# Patient Record
Sex: MALE | Race: WHITE | NOT HISPANIC OR LATINO
[De-identification: names, ages, dates, MRNs, and addresses within clinical notes are randomized per-mention and may not be internally consistent; named-entity substitution may affect disease eponyms.]

---

## 2018-11-08 PROBLEM — Z87.891 FORMER SMOKER: Status: ACTIVE | Noted: 2018-11-08

## 2018-11-14 ENCOUNTER — APPOINTMENT (OUTPATIENT)
Dept: HEART AND VASCULAR | Facility: CLINIC | Age: 43
End: 2018-11-14
Payer: COMMERCIAL

## 2018-11-14 VITALS
DIASTOLIC BLOOD PRESSURE: 74 MMHG | HEIGHT: 71 IN | OXYGEN SATURATION: 97 % | SYSTOLIC BLOOD PRESSURE: 124 MMHG | BODY MASS INDEX: 31.92 KG/M2 | WEIGHT: 228 LBS | HEART RATE: 73 BPM

## 2018-11-14 DIAGNOSIS — Z87.891 PERSONAL HISTORY OF NICOTINE DEPENDENCE: ICD-10-CM

## 2018-11-14 PROCEDURE — 99214 OFFICE O/P EST MOD 30 MIN: CPT | Mod: 25

## 2018-11-14 PROCEDURE — 93000 ELECTROCARDIOGRAM COMPLETE: CPT

## 2018-11-14 NOTE — REVIEW OF SYSTEMS
[Shortness Of Breath] : shortness of breath [Chest Pain] : chest pain [Negative] : Heme/Lymph [see HPI] : see HPI [Cough] : cough [Skin Lesions] : skin lesion(s): [Dyspnea on exertion] : not dyspnea during exertion [Lower Ext Edema] : no extremity edema [Leg Claudication] : no intermittent leg claudication [Palpitations] : no palpitations

## 2018-11-14 NOTE — ASSESSMENT
[FreeTextEntry1] : \par 1. CAD: diagnosed via EBT, Ca+ score 29.4, 81st percentile (08/26/15), s/p mid LAD MI with PCI x 2 (05/25/18)\par             - will pursue aggressive medical management and lifestyle intervention\par             - continue atorvastatin 80mg po daily for maximal CV risk reduction\par             - continue DAPT with ASA and Brilinta x 12 months (possible ROWE on Brillinta)\par \par 2. Tobacco abuse counseling: not tolerant of Chantix (bad dreams),now back on the patch \par             - has not smoked since 05/25/18\par             - d/w importance of strict abstinence from tobacco\par  \par 3. Systolic heart failure: EF 35% s/p LAD MI 05/25/18, NYHA II-III, + cough on lisinopril \par             - continue Toprol XL 50mg po daily\par             - continue losartan 50mg po daily (will check with pharmacists about recall, will switch to HF approved ARB if EF remains reduced)\par             - will send for an echocardiogram\par             - given relatively low SBP may not be able to up titrate dose of HF regimen\par

## 2018-11-14 NOTE — PHYSICAL EXAM
[General Appearance - Well Developed] : well developed [Normal Appearance] : normal appearance [Well Groomed] : well groomed [General Appearance - Well Nourished] : well nourished [No Deformities] : no deformities [General Appearance - In No Acute Distress] : no acute distress [Normal Conjunctiva] : the conjunctiva exhibited no abnormalities [Eyelids - No Xanthelasma] : the eyelids demonstrated no xanthelasmas [Normal Oral Mucosa] : normal oral mucosa [No Oral Pallor] : no oral pallor [No Oral Cyanosis] : no oral cyanosis [Normal Jugular Venous A Waves Present] : normal jugular venous A waves present [Normal Jugular Venous V Waves Present] : normal jugular venous V waves present [No Jugular Venous Miller A Waves] : no jugular venous miller A waves [Respiration, Rhythm And Depth] : normal respiratory rhythm and effort [Exaggerated Use Of Accessory Muscles For Inspiration] : no accessory muscle use [Auscultation Breath Sounds / Voice Sounds] : lungs were clear to auscultation bilaterally [Heart Rate And Rhythm] : heart rate and rhythm were normal [Heart Sounds] : normal S1 and S2 [Murmurs] : no murmurs present [Abdomen Soft] : soft [Abdomen Tenderness] : non-tender [Abdomen Mass (___ Cm)] : no abdominal mass palpated [Abnormal Walk] : normal gait [Gait - Sufficient For Exercise Testing] : the gait was sufficient for exercise testing [Nail Clubbing] : no clubbing of the fingernails [Cyanosis, Localized] : no localized cyanosis [Petechial Hemorrhages (___cm)] : no petechial hemorrhages [Skin Color & Pigmentation] : normal skin color and pigmentation [] : no rash [No Venous Stasis] : no venous stasis [Skin Lesions] : no skin lesions [No Skin Ulcers] : no skin ulcer [No Xanthoma] : no  xanthoma was observed [Oriented To Time, Place, And Person] : oriented to person, place, and time [Affect] : the affect was normal [Mood] : the mood was normal [No Anxiety] : not feeling anxious [FreeTextEntry1] : + ecchymosis

## 2018-11-14 NOTE — REASON FOR VISIT
[Follow-Up - Clinic] : a clinic follow-up of [FreeTextEntry1] : \par Diagnostic Tests:\par --------------------------------\par ECG:\par 11/14/18: NSR, anteroseptal MI. \par 05/29/18: NSR, anteroseptal MI. \par 02/26/18: NSR, normal ECG.\par 10/14/15: NSR, normal ECG.\par ---------------------------------\par CT:\par 08/26/15: EBT Ca+ score: 29, 81st percentile\par ---------------------------------\par Stress:\par 11/05/15: exercise stress echo: EF 64%, 10 METS, normal wall motion and PA pressures.\par ---------------------------------\par Cath:\par 05/225/18: at [facility]: acute NSTEMI LAD: mid % (PCI x 2), otherwise mild disease\par ---------------------------------\par Echo:\par 06/28/18: EF 48%, grade I diastolic dysfunction, apical septal and apical akinesis, trace MR/TR. \par 05/29/18: EF 35%, large apical MI, mild LVH, trace MR/TR.\par

## 2018-11-14 NOTE — HISTORY OF PRESENT ILLNESS
[FreeTextEntry1] : \par Mr. Mott presents for follow up and management of CAD  s/p apical MI with PCI LAD 05/25/18, dyslipidemia, overweight, and tobacco use. As part of a union contract, he had a "heart scan" via EBT at [facility] on 08/26/15 which revealed a CA+ score of 29 (81st percentile).  He had an exercise stress echocardiogram on 11/05/15 which revealed an EF of 64%, 10 METS, normal wall motion, and normal PA pressures.  He has an extensive tobacco use history (1 PPD since his teenage years) and has tried several times.  We had an extensive discussion about the nature of the EBT findings as well as the need for aggressive lifestyle modifications to prevent CV events in the future.  On 5/25/18 he began to experience substernal chest pain which he describes as " an elephant sitting on his chest" associated with SOB.  He called 911 and was taken to [facility], where he was diagnosed with an acute non-ST elevation myocardial infarction and underwent invasive coronary angiography revealing a 100% thrombotic occlusion of the mid LAD with otherwise mild disease.  He went on to have PCI x 2 to his mid LAD with a good result.  (Joshua Schmitt -[phone], fax [phone]).  He was discharged on Sunday.  Since being discharged, he feels fatigued with minor chest discomfort.  He is currently wearing a nicotine patch and has not smoked in 3 days.  He had an echocardiogram today (05/29/18) in the office which revealed an EF of 35%, a large apical MI, and no significant valvular disease.  He recently started cardiac rehab at [facility]. He denies chest pain but has occasional ROWE.  He had to interupt cardiac rehab after sustaining an ankle sprain.  He had a cough on lisinopril and was switched to losartan 50mg po daily.  We discussed the recent losartan recall and he will check with his pharmacist to see if his lot was affected.  \par

## 2018-11-30 ENCOUNTER — APPOINTMENT (OUTPATIENT)
Dept: HEART AND VASCULAR | Facility: CLINIC | Age: 43
End: 2018-11-30
Payer: COMMERCIAL

## 2018-11-30 VITALS
DIASTOLIC BLOOD PRESSURE: 76 MMHG | SYSTOLIC BLOOD PRESSURE: 116 MMHG | HEART RATE: 81 BPM | TEMPERATURE: 98.5 F | OXYGEN SATURATION: 95 %

## 2018-11-30 PROCEDURE — 93306 TTE W/DOPPLER COMPLETE: CPT

## 2019-02-25 ENCOUNTER — NON-APPOINTMENT (OUTPATIENT)
Age: 44
End: 2019-02-25

## 2019-02-25 ENCOUNTER — APPOINTMENT (OUTPATIENT)
Dept: HEART AND VASCULAR | Facility: CLINIC | Age: 44
End: 2019-02-25
Payer: COMMERCIAL

## 2019-02-25 VITALS
HEIGHT: 71 IN | OXYGEN SATURATION: 95 % | TEMPERATURE: 98.2 F | BODY MASS INDEX: 34.02 KG/M2 | WEIGHT: 243 LBS | HEART RATE: 76 BPM | DIASTOLIC BLOOD PRESSURE: 70 MMHG | SYSTOLIC BLOOD PRESSURE: 104 MMHG

## 2019-02-25 PROCEDURE — 99214 OFFICE O/P EST MOD 30 MIN: CPT

## 2019-03-04 ENCOUNTER — APPOINTMENT (OUTPATIENT)
Dept: HEART AND VASCULAR | Facility: CLINIC | Age: 44
End: 2019-03-04
Payer: COMMERCIAL

## 2019-03-04 PROCEDURE — 93351 STRESS TTE COMPLETE: CPT

## 2019-03-18 NOTE — ASSESSMENT
[FreeTextEntry1] : 1. CAD: diagnosed via EBT, Ca+ score 29.4, 81st percentile (08/26/15), s/p mid LAD MI with PCI x 2 (05/25/18)\par             - will pursue aggressive medical management and lifestyle intervention\par             - continue atorvastatin 80mg po daily for maximal CV risk reduction\par             - continue DAPT with ASA and Brilinta x 12 months (possible ROWE on Brillinta)\par             - will send for an exercise stress echocardiogram \par \par 2. Tobacco abuse counseling: not tolerant of Chantix (bad dreams),now back on the patch \par             - has not smoked since 05/25/18\par             - d/w importance of strict abstinence from tobacco\par  \par 3. Systolic heart failure: EF normalized: s/p echo: 11/30/18: EF 58%, akinesis apical septum and apex, trace MR/TR.\par             - continue Toprol XL 50mg po daily\par             - continue losartan 50mg po daily\par \par

## 2019-03-18 NOTE — REASON FOR VISIT
[Follow-Up - Clinic] : a clinic follow-up of [FreeTextEntry1] : Diagnostic Tests:\par --------------------------------\par ECG:\par 11/14/18: NSR, anteroseptal MI. \par 05/29/18: NSR, anteroseptal MI. \par 02/26/18: NSR, normal ECG.\par 10/14/15: NSR, normal ECG.\par ---------------------------------\par CT:\par 08/26/15: EBT Ca+ score: 29, 81st percentile\par ---------------------------------\par Stress:\par 02/19/19: ETT only cardiac rehab: 7.5 METS, normal ECG. \par 11/05/15: exercise stress echo: EF 64%, 10 METS, normal wall motion and PA pressures.\par ---------------------------------\par Cath:\par 05/225/18: at [facility]: acute NSTEMI LAD: mid % (PCI x 2), otherwise mild disease\par ---------------------------------\par Echo:\par 11/30/18: EF 58%, akinesis apical septum and apex, trace MR/TR.\par 06/28/18: EF 48%, grade I diastolic dysfunction, apical septal and apical akinesis, trace MR/TR. \par 05/29/18: EF 35%, large apical MI, mild LVH, trace MR/TR.\par

## 2019-03-18 NOTE — REVIEW OF SYSTEMS
[Shortness Of Breath] : shortness of breath [Chest Pain] : chest pain [see HPI] : see HPI [Cough] : cough [Skin Lesions] : skin lesion(s): [Negative] : Heme/Lymph [Dyspnea on exertion] : not dyspnea during exertion [Lower Ext Edema] : no extremity edema [Leg Claudication] : no intermittent leg claudication [Palpitations] : no palpitations

## 2019-03-18 NOTE — HISTORY OF PRESENT ILLNESS
[FreeTextEntry1] : \par Mr. Mott presents for follow up and management of CAD  s/p apical MI with PCI LAD 05/25/18, dyslipidemia, overweight, and tobacco use. As part of a union contract, he had a "heart scan" via EBT at [facility] on 08/26/15 which revealed a CA+ score of 29 (81st percentile).  He had an exercise stress echocardiogram on 11/05/15 which revealed an EF of 64%, 10 METS, normal wall motion, and normal PA pressures.  He has an extensive tobacco use history (1 PPD since his teenage years) and has tried several times.  We had an extensive discussion about the nature of the EBT findings as well as the need for aggressive lifestyle modifications to prevent CV events in the future.  On 5/25/18 he began to experience substernal chest pain which he describes as " an elephant sitting on his chest" associated with SOB.  He called 911 and was taken to [facility], where he was diagnosed with an acute non-ST elevation myocardial infarction and underwent invasive coronary angiography revealing a 100% thrombotic occlusion of the mid LAD with otherwise mild disease.  He went on to have PCI x 2 to his mid LAD with a good result.  (Joshua Schmitt -[phone], fax [phone]).  He was discharged on Sunday.  Since being discharged, he feels fatigued with minor chest discomfort.  He is currently wearing a nicotine patch and has not smoked in 3 days.  He had an echocardiogram today (05/29/18) in the office which revealed an EF of 35%, a large apical MI, and no significant valvular disease.  He completed cardiac rehab at [facility]. He denies chest pain but has occasional ROWE.  He had to interupt cardiac rehab after sustaining an ankle sprain.  He had a cough on lisinopril and was switched to losartan 50mg po daily.   He had a repeat echocardiogram on 11/30/18 which revealed an EF of 58%, akinesis apical septum and apex, and trace MR/TR.  He does admit to occasional chest pain and ROWE.  We had a discussion about the duration of DAPT therapy being long-term unless bleeding occurs.  In addition, we discussed the results of the TRED- HF trial and agreed to remain on Toprol and losartan. \par

## 2019-05-16 ENCOUNTER — APPOINTMENT (OUTPATIENT)
Dept: HEART AND VASCULAR | Facility: CLINIC | Age: 44
End: 2019-05-16
Payer: COMMERCIAL

## 2019-05-16 VITALS
HEIGHT: 71 IN | BODY MASS INDEX: 34.52 KG/M2 | DIASTOLIC BLOOD PRESSURE: 68 MMHG | TEMPERATURE: 98.2 F | WEIGHT: 246.6 LBS | HEART RATE: 84 BPM | OXYGEN SATURATION: 96 % | SYSTOLIC BLOOD PRESSURE: 105 MMHG

## 2019-05-16 PROCEDURE — 99214 OFFICE O/P EST MOD 30 MIN: CPT

## 2019-05-16 NOTE — HISTORY OF PRESENT ILLNESS
[FreeTextEntry1] : Mr. Mott presents for follow up and management of CAD  s/p apical MI with PCI LAD 05/25/18, dyslipidemia, overweight, and tobacco use. As part of a union contract, he had a "heart scan" via EBT at [facility] on 08/26/15 which revealed a CA+ score of 29 (81st percentile).  He had an exercise stress echocardiogram on 11/05/15 which revealed an EF of 64%, 10 METS, normal wall motion, and normal PA pressures.  He has an extensive tobacco use history (1 PPD since his teenage years) and has tried several times.  We had an extensive discussion about the nature of the EBT findings as well as the need for aggressive lifestyle modifications to prevent CV events in the future.  On 5/25/18 he began to experience substernal chest pain which he describes as " an elephant sitting on his chest" associated with SOB.  He called 911 and was taken to [facility], where he was diagnosed with an acute non-ST elevation myocardial infarction and underwent invasive coronary angiography revealing a 100% thrombotic occlusion of the mid LAD with otherwise mild disease.  He went on to have PCI x 2 to his mid LAD with a good result.  (Joshua Schmitt [phone], fax [phone]).  He was discharged on Sunday.  Since being discharged, he feels fatigued with minor chest discomfort.  He is currently wearing a nicotine patch and has not smoked in 3 days.  He had an echocardiogram on 05/29/18 which revealed an EF of 35%, a large apical MI, and no significant valvular disease.  He completed cardiac rehab at [facility]. He denies chest pain but has occasional ROWE.  He had to interupt cardiac rehab after sustaining an ankle sprain.  He had a cough on lisinopril and was switched to losartan 50mg po daily.   He had a repeat echocardiogram on 11/30/18 which revealed an EF of 58%, akinesis apical septum and apex, and trace MR/TR.  He does admit to occasional chest pain and ROWE.  He had an exercise stress echocardiogram on 03/04/19 which reveled an EF of 60%, akinesis apical anterior, apical septum, and apex at rest, no inducible ischemia and normal PA pressures post 10 METS.   We had a discussion about the duration of DAPT therapy being long-term unless bleeding occurs.  In addition, we discussed the results of the TRED-HF trial and agreed to remain on Toprol and losartan.  He was evaluated by a neurologist and was given a prescription for Neurontin.  His ROWE is stable at 1-2 blocks.  He continues to have anxiety surrounding his cardiac event. \par

## 2019-05-16 NOTE — REASON FOR VISIT
[FreeTextEntry1] : Diagnostic Tests:\par --------------------------------\par ECG:\par 11/14/18: NSR, anteroseptal MI. \par 05/29/18: NSR, anteroseptal MI. \par 02/26/18: NSR, normal ECG.\par 10/14/15: NSR, normal ECG.\par ---------------------------------\par CT:\par 08/26/15: EBT Ca+ score: 29, 81st percentile\par ---------------------------------\par Stress:\par 03/04/19: exercise stress echo: EF 60%, akinesis apical anterior, apical septum, and apex at rest, no inducible ischemia and normal PA pressures post 10 METS. \par 02/19/19: ETT only cardiac rehab: 7.5 METS, normal ECG. \par 11/05/15: exercise stress echo: EF 64%, 10 METS, normal wall motion and PA pressures.\par ---------------------------------\par Cath:\par 05/225/18: at [facility]: acute NSTEMI LAD: mid % (PCI x 2), otherwise mild disease\par ---------------------------------\par Echo:\par 11/30/18: EF 58%, akinesis apical septum and apex, trace MR/TR.\par 06/28/18: EF 48%, grade I diastolic dysfunction, apical septal and apical akinesis, trace MR/TR. \par 05/29/18: EF 35%, large apical MI, mild LVH, trace MR/TR.\par

## 2019-05-16 NOTE — ASSESSMENT
[FreeTextEntry1] : 1. CAD: diagnosed via EBT, Ca+ score 29.4, 81st percentile (08/26/15), s/p mid LAD MI with PCI x 2 (05/25/18), s/p 03/04/19: exercise stress echo: EF 60%, akinesis apical anterior, apical septum, and apex at rest, no inducible ischemia and normal PA pressures post 10 METS. \par             - will pursue aggressive medical management and lifestyle intervention\par             - continue atorvastatin 80mg po daily for maximal CV risk reduction\par             - continue DAPT with ASA and clopidogrel lifelong as tolerated\par             - check lab work today\par \par 2. Tobacco abuse counseling: not tolerant of Chantix (bad dreams)\par             - has not smoked since 05/25/18\par             - d/w importance of strict abstinence from tobacco\par  \par 3. Systolic heart failure: EF normalized: s/p echo: 11/30/18: EF 58%, akinesis apical septum and apex, trace MR/TR.\par             - continue Toprol XL 50mg po daily\par             - continue losartan 50mg po daily\par \par

## 2019-05-16 NOTE — REVIEW OF SYSTEMS
[Dyspnea on exertion] : not dyspnea during exertion [Leg Claudication] : no intermittent leg claudication [Lower Ext Edema] : no extremity edema [Palpitations] : no palpitations [Skin Lesions] : no skin lesions [Cough] : no cough

## 2019-05-17 LAB
ALBUMIN SERPL ELPH-MCNC: 4.4 G/DL
ALP BLD-CCNC: 131 U/L
ALT SERPL-CCNC: 53 U/L
ANION GAP SERPL CALC-SCNC: 14 MMOL/L
AST SERPL-CCNC: 30 U/L
BASOPHILS # BLD AUTO: 0.06 K/UL
BASOPHILS NFR BLD AUTO: 0.8 %
BILIRUB SERPL-MCNC: 0.5 MG/DL
BUN SERPL-MCNC: 15 MG/DL
CALCIUM SERPL-MCNC: 9 MG/DL
CHLORIDE SERPL-SCNC: 104 MMOL/L
CHOLEST SERPL-MCNC: 135 MG/DL
CHOLEST/HDLC SERPL: 5.9 RATIO
CO2 SERPL-SCNC: 23 MMOL/L
CREAT SERPL-MCNC: 1.02 MG/DL
EOSINOPHIL # BLD AUTO: 0.16 K/UL
EOSINOPHIL NFR BLD AUTO: 2.1 %
ESTIMATED AVERAGE GLUCOSE: 111 MG/DL
GLUCOSE SERPL-MCNC: 116 MG/DL
HBA1C MFR BLD HPLC: 5.5 %
HCT VFR BLD CALC: 50.3 %
HDLC SERPL-MCNC: 23 MG/DL
HGB BLD-MCNC: 16.1 G/DL
IMM GRANULOCYTES NFR BLD AUTO: 1.1 %
LDLC SERPL CALC-MCNC: 43 MG/DL
LDLC SERPL DIRECT ASSAY-MCNC: 75 MG/DL
LYMPHOCYTES # BLD AUTO: 1.92 K/UL
LYMPHOCYTES NFR BLD AUTO: 25.6 %
MAN DIFF?: NORMAL
MCHC RBC-ENTMCNC: 30.7 PG
MCHC RBC-ENTMCNC: 32 GM/DL
MCV RBC AUTO: 95.8 FL
MONOCYTES # BLD AUTO: 0.59 K/UL
MONOCYTES NFR BLD AUTO: 7.9 %
NEUTROPHILS # BLD AUTO: 4.7 K/UL
NEUTROPHILS NFR BLD AUTO: 62.5 %
PLATELET # BLD AUTO: 319 K/UL
POTASSIUM SERPL-SCNC: 5 MMOL/L
PROT SERPL-MCNC: 7.5 G/DL
RBC # BLD: 5.25 M/UL
RBC # FLD: 13 %
SODIUM SERPL-SCNC: 141 MMOL/L
TRIGL SERPL-MCNC: 343 MG/DL
TSH SERPL-ACNC: 0.95 UIU/ML
WBC # FLD AUTO: 7.51 K/UL

## 2019-06-25 ENCOUNTER — MEDICATION RENEWAL (OUTPATIENT)
Age: 44
End: 2019-06-25

## 2019-07-08 ENCOUNTER — APPOINTMENT (OUTPATIENT)
Dept: HEART AND VASCULAR | Facility: CLINIC | Age: 44
End: 2019-07-08
Payer: COMMERCIAL

## 2019-07-08 VITALS
HEIGHT: 71 IN | HEART RATE: 82 BPM | TEMPERATURE: 98.6 F | OXYGEN SATURATION: 96 % | DIASTOLIC BLOOD PRESSURE: 75 MMHG | SYSTOLIC BLOOD PRESSURE: 120 MMHG | WEIGHT: 248 LBS | BODY MASS INDEX: 34.72 KG/M2

## 2019-07-08 PROCEDURE — 99214 OFFICE O/P EST MOD 30 MIN: CPT

## 2019-07-08 NOTE — REASON FOR VISIT
[Follow-Up - Clinic] : a clinic follow-up of [FreeTextEntry1] : CAD s/p apical MI with PCI LAD 05/25/18, dyslipidemia, overweight, and tobacco use

## 2019-07-08 NOTE — PHYSICAL EXAM
[General Appearance - Well Developed] : well developed [Normal Appearance] : normal appearance [General Appearance - Well Nourished] : well nourished [Well Groomed] : well groomed [No Deformities] : no deformities [Normal Conjunctiva] : the conjunctiva exhibited no abnormalities [General Appearance - In No Acute Distress] : no acute distress [No Oral Pallor] : no oral pallor [Normal Oral Mucosa] : normal oral mucosa [Eyelids - No Xanthelasma] : the eyelids demonstrated no xanthelasmas [No Oral Cyanosis] : no oral cyanosis [Normal Jugular Venous A Waves Present] : normal jugular venous A waves present [No Jugular Venous Miller A Waves] : no jugular venous miller A waves [Normal Jugular Venous V Waves Present] : normal jugular venous V waves present [Heart Rate And Rhythm] : heart rate and rhythm were normal [Respiration, Rhythm And Depth] : normal respiratory rhythm and effort [Exaggerated Use Of Accessory Muscles For Inspiration] : no accessory muscle use [Auscultation Breath Sounds / Voice Sounds] : lungs were clear to auscultation bilaterally [Heart Sounds] : normal S1 and S2 [Murmurs] : no murmurs present [Abdomen Soft] : soft [Abdomen Tenderness] : non-tender [Abdomen Mass (___ Cm)] : no abdominal mass palpated [Abnormal Walk] : normal gait [Gait - Sufficient For Exercise Testing] : the gait was sufficient for exercise testing [Nail Clubbing] : no clubbing of the fingernails [Cyanosis, Localized] : no localized cyanosis [Petechial Hemorrhages (___cm)] : no petechial hemorrhages [] : no rash [Skin Color & Pigmentation] : normal skin color and pigmentation [No Skin Ulcers] : no skin ulcer [Skin Lesions] : no skin lesions [No Venous Stasis] : no venous stasis [Oriented To Time, Place, And Person] : oriented to person, place, and time [FreeTextEntry1] : + ecchymosis [No Xanthoma] : no  xanthoma was observed [Mood] : the mood was normal [No Anxiety] : not feeling anxious [Affect] : the affect was normal

## 2019-07-08 NOTE — REVIEW OF SYSTEMS
[Shortness Of Breath] : shortness of breath [Chest Pain] : chest pain [Dyspnea on exertion] : not dyspnea during exertion [Leg Claudication] : no intermittent leg claudication [Lower Ext Edema] : no extremity edema [Palpitations] : no palpitations [see HPI] : see HPI [Cough] : no cough [Skin Lesions] : no skin lesions [Negative] : Psychiatric

## 2019-07-08 NOTE — HISTORY OF PRESENT ILLNESS
[FreeTextEntry1] : Mr. Mott presents for follow up and management of CAD  s/p apical MI with PCI LAD 05/25/18, dyslipidemia, overweight, and tobacco use. As part of a union contract, he had a "heart scan" via EBT at [facility] on 08/26/15 which revealed a CA+ score of 29 (81st percentile).  He had an exercise stress echocardiogram on 11/05/15 which revealed an EF of 64%, 10 METS, normal wall motion, and normal PA pressures.  He has an extensive tobacco use history (1 PPD since his teenage years) and has tried several times.  We had an extensive discussion about the nature of the EBT findings as well as the need for aggressive lifestyle modifications to prevent CV events in the future.  On 5/25/18 he began to experience substernal chest pain which he describes as " an elephant sitting on his chest" associated with SOB.  He called 911 and was taken to [facility], where he was diagnosed with an acute non-ST elevation myocardial infarction and underwent invasive coronary angiography revealing a 100% thrombotic occlusion of the mid LAD with otherwise mild disease.  He went on to have PCI x 2 to his mid LAD with a good result.  (Joshua Schmitt -[phone], fax [phone]).  He was discharged on Sunday.  Since being discharged, he feels fatigued with minor chest discomfort.  He is currently wearing a nicotine patch and has not smoked in 3 days.  He had an echocardiogram on 05/29/18 which revealed an EF of 35%, a large apical MI, and no significant valvular disease.  He completed cardiac rehab at [facility]. He denies chest pain but has occasional ROWE.  He had to interrupt cardiac rehab after sustaining an ankle sprain.  He had a cough on lisinopril and was switched to losartan 50mg po daily.   He had a repeat echocardiogram on 11/30/18 which revealed an EF of 58%, akinesis apical septum and apex, and trace MR/TR.  He does admit to occasional chest pain and ROWE.  He had an exercise stress echocardiogram on 03/04/19 which reveled an EF of 60%, akinesis apical anterior, apical septum, and apex at rest, no inducible ischemia and normal PA pressures post 10 METS.   We had a discussion about the duration of DAPT therapy being long-term unless bleeding occurs.  In addition, we discussed the results of the TRED-HF trial and agreed to remain on Toprol and losartan.  He was evaluated by a neurologist for cervical and lumbar spinal neuropathy and was given a prescription for Neurontin but continues to have neuropathic pain.  His ROWE is stable at 1-2 blocks.  He continues to have anxiety surrounding his cardiac event. \par

## 2019-07-08 NOTE — ASSESSMENT
[FreeTextEntry1] : 1. CAD: diagnosed via EBT, Ca+ score 29.4, 81st percentile (08/26/15), s/p mid LAD MI with PCI x 2 (05/25/18), s/p 03/04/19: exercise stress echo: EF 60%, akinesis apical anterior, apical septum, and apex at rest, no inducible ischemia and normal PA pressures post 10 METS. \par             - will pursue aggressive medical management and lifestyle intervention\par             - continue atorvastatin 80mg po daily for maximal CV risk reduction\par             - continue DAPT with ASA and clopidogrel lifelong as tolerated\par \par 2. Tobacco abuse counseling: not tolerant of Chantix (bad dreams)\par             - has not smoked since 05/25/18\par             - d/w importance of strict abstinence from tobacco\par  \par 3. Systolic heart failure: EF normalized: s/p echo: 11/30/18: EF 58%, akinesis apical septum and apex, trace MR/TR.\par             - continue Toprol XL 50mg po daily\par             - continue losartan 50mg po daily\par \par 4. Cervical and lumbar disc disease: + neuropathy\par             - continue follow up with neurologist\par

## 2019-08-26 ENCOUNTER — NON-APPOINTMENT (OUTPATIENT)
Age: 44
End: 2019-08-26

## 2019-08-26 ENCOUNTER — APPOINTMENT (OUTPATIENT)
Dept: HEART AND VASCULAR | Facility: CLINIC | Age: 44
End: 2019-08-26
Payer: COMMERCIAL

## 2019-08-26 VITALS
SYSTOLIC BLOOD PRESSURE: 112 MMHG | HEART RATE: 80 BPM | DIASTOLIC BLOOD PRESSURE: 73 MMHG | WEIGHT: 249 LBS | TEMPERATURE: 98.1 F | OXYGEN SATURATION: 97 % | BODY MASS INDEX: 34.86 KG/M2 | HEIGHT: 71 IN

## 2019-08-26 PROCEDURE — 99214 OFFICE O/P EST MOD 30 MIN: CPT | Mod: 25

## 2019-08-26 PROCEDURE — 93000 ELECTROCARDIOGRAM COMPLETE: CPT

## 2019-08-26 NOTE — REASON FOR VISIT
[Follow-Up - Clinic] : a clinic follow-up of [FreeTextEntry1] : Diagnostic Tests:\par --------------------------------\par ECG:\par 08/26/19: NSR, anteroseptal MI. \par 11/14/18: NSR, anteroseptal MI. \par 05/29/18: NSR, anteroseptal MI. \par 02/26/18: NSR, normal ECG.\par 10/14/15: NSR, normal ECG.\par ---------------------------------\par CT:\par 08/26/15: EBT Ca+ score: 29, 81st percentile\par ---------------------------------\par Stress:\par 03/04/19: exercise stress echo: EF 60%, akinesis apical anterior, apical septum, and apex at rest, no inducible ischemia and normal PA pressures post 10 METS. \par 02/19/19: ETT only cardiac rehab: 7.5 METS, normal ECG. \par 11/05/15: exercise stress echo: EF 64%, 10 METS, normal wall motion and PA pressures.\par ---------------------------------\par Cath:\par 05/25/18: at [facility]: acute NSTEMI LAD: mid % (PCI x 2), otherwise mild disease\par ---------------------------------\par Echo:\par 11/30/18: EF 58%, akinesis apical septum and apex, trace MR/TR.\par 06/28/18: EF 48%, grade I diastolic dysfunction, apical septal and apical akinesis, trace MR/TR. \par 05/29/18: EF 35%, large apical MI, mild LVH, trace MR/TR.\par

## 2019-08-26 NOTE — REVIEW OF SYSTEMS
[Shortness Of Breath] : shortness of breath [Chest Pain] : chest pain [see HPI] : see HPI [Negative] : Heme/Lymph [Dyspnea on exertion] : not dyspnea during exertion [Leg Claudication] : no intermittent leg claudication [Lower Ext Edema] : no extremity edema [Cough] : no cough [Palpitations] : no palpitations [Skin Lesions] : no skin lesions

## 2019-08-26 NOTE — HISTORY OF PRESENT ILLNESS
[FreeTextEntry1] : Mr. Mott presents for follow up and management of CAD  s/p apical MI with PCI LAD 05/25/18, dyslipidemia, overweight, and tobacco use. As part of a union contract, he had a "heart scan" via EBT at [facility] on 08/26/15 which revealed a CA+ score of 29 (81st percentile).  He had an exercise stress echocardiogram on 11/05/15 which revealed an EF of 64%, 10 METS, normal wall motion, and normal PA pressures.  He has an extensive tobacco use history (1 PPD since his teenage years) and has tried several times.  We had an extensive discussion about the nature of the EBT findings as well as the need for aggressive lifestyle modifications to prevent CV events in the future.  On 5/25/18 he began to experience substernal chest pain which he describes as " an elephant sitting on his chest" associated with SOB.  He called 911 and was taken to [facility], where he was diagnosed with an acute non-ST elevation myocardial infarction and underwent invasive coronary angiography revealing a 100% thrombotic occlusion of the mid LAD with otherwise mild disease.  He went on to have PCI x 2 to his mid LAD with a good result.  (Joshua Schmitt -[phone], fax [phone]).  He was discharged on Sunday.  Since being discharged, he feels fatigued with minor chest discomfort.  He is currently wearing a nicotine patch and has not smoked in 3 days.  He had an echocardiogram on 05/29/18 which revealed an EF of 35%, a large apical MI, and no significant valvular disease.  He completed cardiac rehab at [facility]. He denies chest pain but has occasional ROWE.  He had to interrupt cardiac rehab after sustaining an ankle sprain.  He had a cough on lisinopril and was switched to losartan 50mg po daily.   He had a repeat echocardiogram on 11/30/18 which revealed an EF of 58%, akinesis apical septum and apex, and trace MR/TR.  He does admit to occasional chest pain and ROWE.  He had an exercise stress echocardiogram on 03/04/19 which reveled an EF of 60%, akinesis apical anterior, apical septum, and apex at rest, no inducible ischemia and normal PA pressures post 10 METS.   We had a discussion about the duration of DAPT therapy being long-term unless bleeding occurs.  In addition, we discussed the results of the TRED-HF trial and agreed to remain on Toprol and losartan.  He was evaluated by a neurologist for cervical and lumbar spinal neuropathy and was given a prescription for Neurontin but continues to have neuropathic pain.  His ROWE is stable at 1-2 blocks. He continues to have complaints of intermittent chest discomfort.  he is complaint with all medications. \par

## 2019-08-26 NOTE — PHYSICAL EXAM
[General Appearance - Well Developed] : well developed [Normal Appearance] : normal appearance [Well Groomed] : well groomed [General Appearance - Well Nourished] : well nourished [No Deformities] : no deformities [General Appearance - In No Acute Distress] : no acute distress [Normal Conjunctiva] : the conjunctiva exhibited no abnormalities [Eyelids - No Xanthelasma] : the eyelids demonstrated no xanthelasmas [Normal Oral Mucosa] : normal oral mucosa [No Oral Pallor] : no oral pallor [No Oral Cyanosis] : no oral cyanosis [Normal Jugular Venous A Waves Present] : normal jugular venous A waves present [Normal Jugular Venous V Waves Present] : normal jugular venous V waves present [No Jugular Venous Miller A Waves] : no jugular venous miller A waves [Respiration, Rhythm And Depth] : normal respiratory rhythm and effort [Exaggerated Use Of Accessory Muscles For Inspiration] : no accessory muscle use [Auscultation Breath Sounds / Voice Sounds] : lungs were clear to auscultation bilaterally [Heart Rate And Rhythm] : heart rate and rhythm were normal [Heart Sounds] : normal S1 and S2 [Murmurs] : no murmurs present [Abdomen Soft] : soft [Abdomen Tenderness] : non-tender [Abdomen Mass (___ Cm)] : no abdominal mass palpated [Abnormal Walk] : normal gait [Nail Clubbing] : no clubbing of the fingernails [Gait - Sufficient For Exercise Testing] : the gait was sufficient for exercise testing [Cyanosis, Localized] : no localized cyanosis [Petechial Hemorrhages (___cm)] : no petechial hemorrhages [No Venous Stasis] : no venous stasis [Skin Color & Pigmentation] : normal skin color and pigmentation [] : no rash [Skin Lesions] : no skin lesions [No Skin Ulcers] : no skin ulcer [No Xanthoma] : no  xanthoma was observed [Oriented To Time, Place, And Person] : oriented to person, place, and time [Mood] : the mood was normal [Affect] : the affect was normal [No Anxiety] : not feeling anxious [FreeTextEntry1] : + ecchymosis

## 2019-08-26 NOTE — ASSESSMENT
[FreeTextEntry1] : 1. CAD: diagnosed via EBT, Ca+ score 29.4, 81st percentile (08/26/15), s/p mid LAD MI with PCI x 2 (05/25/18), s/p 03/04/19: exercise stress echo: EF 60%, akinesis apical anterior, apical septum, and apex at rest, no inducible ischemia and normal PA pressures post 10 METS. \par             - will pursue aggressive medical management and lifestyle intervention\par             - continue atorvastatin 80mg po daily for maximal CV risk reduction\par             - continue DAPT with ASA and clopidogrel lifelong as tolerated\par \par 2. Tobacco abuse counseling: not tolerant of Chantix (bad dreams)\par             - has not smoked since 05/25/18\par             - d/w importance of strict abstinence from tobacco\par  \par 3. Systolic heart failure: EF normalized: s/p echo: 11/30/18: EF 58%, akinesis apical septum and apex, trace MR/TR.\par             - continue Toprol XL 50mg po daily\par             - continue losartan 50mg po daily\par \par 4. Cervical and lumbar disc disease: + neuropathy\par             - continue follow up with neurologist\par \par 5. r/o Carotid artery disease:\par             - will send for a carotid sonogram

## 2019-09-10 ENCOUNTER — APPOINTMENT (OUTPATIENT)
Dept: HEART AND VASCULAR | Facility: CLINIC | Age: 44
End: 2019-09-10
Payer: COMMERCIAL

## 2019-09-10 PROCEDURE — 93880 EXTRACRANIAL BILAT STUDY: CPT

## 2019-09-18 ENCOUNTER — FORM ENCOUNTER (OUTPATIENT)
Age: 44
End: 2019-09-18

## 2019-09-19 ENCOUNTER — APPOINTMENT (OUTPATIENT)
Dept: CT IMAGING | Facility: CLINIC | Age: 44
End: 2019-09-19
Payer: COMMERCIAL

## 2019-09-19 ENCOUNTER — OUTPATIENT (OUTPATIENT)
Dept: OUTPATIENT SERVICES | Facility: HOSPITAL | Age: 44
LOS: 1 days | End: 2019-09-19

## 2019-09-19 PROCEDURE — 70498 CT ANGIOGRAPHY NECK: CPT | Mod: 26

## 2019-11-25 ENCOUNTER — APPOINTMENT (OUTPATIENT)
Dept: HEART AND VASCULAR | Facility: CLINIC | Age: 44
End: 2019-11-25
Payer: COMMERCIAL

## 2019-11-25 VITALS
BODY MASS INDEX: 34.16 KG/M2 | HEART RATE: 83 BPM | WEIGHT: 244 LBS | SYSTOLIC BLOOD PRESSURE: 121 MMHG | TEMPERATURE: 98 F | OXYGEN SATURATION: 97 % | HEIGHT: 71 IN | DIASTOLIC BLOOD PRESSURE: 80 MMHG

## 2019-11-25 DIAGNOSIS — Z86.79 PERSONAL HISTORY OF OTHER DISEASES OF THE CIRCULATORY SYSTEM: ICD-10-CM

## 2019-11-25 PROCEDURE — 99214 OFFICE O/P EST MOD 30 MIN: CPT

## 2019-11-25 NOTE — REASON FOR VISIT
[FreeTextEntry1] : Diagnostic Tests:\par --------------------------------\par ECG:\par 08/26/19: NSR, anteroseptal MI. \par 11/14/18: NSR, anteroseptal MI. \par 05/29/18: NSR, anteroseptal MI. \par 02/26/18: NSR, normal ECG.\par 10/14/15: NSR, normal ECG.\par ---------------------------------\par CT:\par 09/19/19: CTA head and neck: minimal carotid plaque b/l, no right carotid dissection. \par 08/26/15: EBT Ca+ score: 29, 81st percentile\par ---------------------------------\par Stress:\par 03/04/19: exercise stress echo: EF 60%, akinesis apical anterior, apical septum, and apex at rest, no inducible ischemia and normal PA pressures post 10 METS. \par 02/19/19: ETT only cardiac rehab: 7.5 METS, normal ECG. \par 11/05/15: exercise stress echo: EF 64%, 10 METS, normal wall motion and PA pressures.\par ---------------------------------\par Cath:\par 05/25/18: at [place]: acute NSTEMI LAD: mid % (PCI x 2), otherwise mild disease\par ---------------------------------\par Echo:\par 11/30/18: EF 58%, akinesis apical septum and apex, trace MR/TR.\par 06/28/18: EF 48%, grade I diastolic dysfunction, apical septal and apical akinesis, trace MR/TR. \par 05/29/18: EF 35%, large apical MI, mild LVH, trace MR/TR.\par ---------------------------------\par Carotid:\par 09/10/19: sono: b/l prox ICA 1-19%, possible RCCA dissection flap\par

## 2019-11-25 NOTE — REVIEW OF SYSTEMS
[Dyspnea on exertion] : not dyspnea during exertion [Lower Ext Edema] : no extremity edema [Palpitations] : no palpitations [Leg Claudication] : no intermittent leg claudication [Cough] : no cough [Skin Lesions] : no skin lesions

## 2019-11-25 NOTE — ASSESSMENT
[FreeTextEntry1] : 1. CAD: diagnosed via EBT, Ca+ score 29.4, 81st percentile (08/26/15), s/p mid LAD MI with PCI x 2 (05/25/18), s/p 03/04/19: exercise stress echo: EF 60%, akinesis apical anterior, apical septum, and apex at rest, no inducible ischemia and normal PA pressures post 10 METS. \par             - will pursue aggressive medical management and lifestyle intervention\par             - continue atorvastatin 80mg po daily for maximal CV risk reduction\par             - continue DAPT with ASA and clopidogrel lifelong as tolerated\par \par 2. Tobacco abuse counseling: not tolerant of Chantix (bad dreams)\par             - has not smoked since 05/25/18\par             - d/w importance of strict abstinence from tobacco\par  \par 3. Systolic heart failure: EF normalized: s/p echo: 11/30/18: EF 58%, akinesis apical septum and apex, trace MR/TR.\par             - continue Toprol XL 50mg po daily\par             - continue losartan 50mg po daily\par \par 4. Cervical and lumbar disc disease: + neuropathy\par             - continue follow up with neurologist\par \par 5. Carotid artery disease: mild \par             - will pursue medical management

## 2019-11-25 NOTE — HISTORY OF PRESENT ILLNESS
[FreeTextEntry1] : Mr. Mott presents for follow up and management of CAD  s/p apical MI with PCI LAD 05/25/18, dyslipidemia, overweight, and tobacco use. As part of a union contract, he had a "heart scan" via EBT at [facility] on 08/26/15 which revealed a CA+ score of 29 (81st percentile).  He had an exercise stress echocardiogram on 11/05/15 which revealed an EF of 64%, 10 METS, normal wall motion, and normal PA pressures.  He has an extensive tobacco use history (1 PPD since his teenage years).  On 5/25/18 he began to experience substernal chest pain which he described as " an elephant sitting on his chest" associated with SOB.  He called 911 and was taken to [facility], where he was diagnosed with an acute non-ST elevation myocardial infarction and underwent invasive coronary angiography revealing a 100% thrombotic occlusion of the mid LAD with otherwise mild disease.  He went on to have PCI x 2 to his mid LAD with a good result.  (Joshua Schmitt -[phone], fax [phone]).  He had an echocardiogram on 05/29/18 which revealed an EF of 35%, a large apical MI, and no significant valvular disease.  He completed cardiac rehab at [facility].  He had a cough on lisinopril and was switched to losartan 50mg po daily.   He had a repeat echocardiogram on 11/30/18 which revealed an EF of 58%, akinesis apical septum and apex, and trace MR/TR.  He had an exercise stress echocardiogram on 03/04/19 which reveled an EF of 60%, akinesis apical anterior, apical septum, and apex at rest, no inducible ischemia and normal PA pressures post 10 METS.   We had a discussion about the duration of DAPT therapy being long-term unless bleeding occurs.  In addition, we discussed the results of the TRED-HF trial and agreed to remain on Toprol and losartan.  He was evaluated by a neurologist for cervical and lumbar spinal neuropathy and was given a prescription for Neurontin but continues to have neuropathic pain.  He continues to have complaints of intermittent chest pain, ROWE, and significant bilateral lower extremity neuropathic pain.

## 2020-02-10 ENCOUNTER — APPOINTMENT (OUTPATIENT)
Dept: HEART AND VASCULAR | Facility: CLINIC | Age: 45
End: 2020-02-10
Payer: COMMERCIAL

## 2020-02-10 VITALS
WEIGHT: 254 LBS | HEIGHT: 71 IN | HEART RATE: 90 BPM | DIASTOLIC BLOOD PRESSURE: 81 MMHG | OXYGEN SATURATION: 96 % | BODY MASS INDEX: 35.56 KG/M2 | SYSTOLIC BLOOD PRESSURE: 114 MMHG

## 2020-02-10 PROCEDURE — 99214 OFFICE O/P EST MOD 30 MIN: CPT

## 2020-02-10 NOTE — REASON FOR VISIT
[FreeTextEntry1] : Diagnostic Tests:\par --------------------------------\par ECG:\par 08/26/19: NSR, anteroseptal MI. \par 11/14/18: NSR, anteroseptal MI. \par 05/29/18: NSR, anteroseptal MI. \par 02/26/18: NSR, normal ECG.\par 10/14/15: NSR, normal ECG.\par ---------------------------------\par CT:\par 09/19/19: CTA head and neck: minimal carotid plaque b/l, no right carotid dissection. \par 08/26/15: EBT Ca+ score: 29, 81st percentile\par ---------------------------------\par Stress:\par 03/04/19: exercise stress echo: EF 60%, akinesis apical anterior, apical septum, and apex at rest, no inducible ischemia and normal PA pressure post 10 METS. \par 02/19/19: ETT only cardiac rehab: 7.5 METS, normal ECG. \par 11/05/15: exercise stress echo: EF 64%, 10 METS, normal wall motion and PA pressures.\par ---------------------------------\par Cath:\par 05/25/18: at [place]: acute NSTEMI LAD: mid % (PCI x 2), otherwise mild disease\par ---------------------------------\par Echo:\par 11/30/18: EF 58%, akinesis apical septum and apex, trace MR/TR.\par 06/28/18: EF 48%, grade I diastolic dysfunction, apical septal and apical akinesis, trace MR/TR. \par 05/29/18: EF 35%, large apical MI, mild LVH, trace MR/TR.\par ---------------------------------\par Carotid:\par 09/10/19: sono: b/l prox ICA 1-19%, possible RCCA dissection flap\par

## 2020-02-10 NOTE — REVIEW OF SYSTEMS
[Palpitations] : palpitations [Heartburn] : heartburn [Dyspnea on exertion] : not dyspnea during exertion [Lower Ext Edema] : no extremity edema [Leg Claudication] : no intermittent leg claudication [Cough] : no cough [Skin Lesions] : no skin lesions

## 2020-02-10 NOTE — HISTORY OF PRESENT ILLNESS
[FreeTextEntry1] : Mr. Mott presents for follow up and management of CAD s/p apical MI with PCI LAD 05/25/18, dyslipidemia, overweight, and tobacco use.   As part of a union contract, he had a "heart scan" via EBT at [facility] on 08/26/15 which revealed a CA+ score of 29 (81st percentile).  He had an exercise stress echocardiogram on 11/05/15 which revealed an EF of 64%, 10 METS, normal wall motion, and normal PA pressures.  He has an extensive tobacco use history (1 PPD since his teenage years).  On 5/25/18 he began to experience substernal chest pain which he described as " an elephant sitting on his chest" associated with SOB.  He called 911 and was taken to [facility], where he was diagnosed with an acute non-ST elevation myocardial infarction and underwent invasive coronary angiography revealing a 100% thrombotic occlusion of the mid LAD with otherwise mild disease.  He went on to have PCI x 2 to his mid LAD with a good result.  (Joshua Schmitt -[phone], fax [phone]).  He had an echocardiogram on 05/29/18 which revealed an EF of 35%, a large apical MI, and no significant valvular disease.  He completed cardiac rehab at [facility].  He had a cough on lisinopril and was switched to losartan 50mg po daily.   He had a repeat echocardiogram on 11/30/18 which revealed an EF of 58%, akinesis apical septum and apex, and trace MR/TR.  He had an exercise stress echocardiogram on 03/04/19 which reveled an EF of 60%, akinesis apical anterior, apical septum, and apex at rest, no inducible ischemia and normal PA pressures post 10 METS.   We had a discussion about the duration of DAPT therapy being long-term unless bleeding occurs.  In addition, we discussed the results of the TRED-HF trial and agreed to remain on Toprol and losartan.  He was evaluated by a neurologist for cervical and lumbar spinal neuropathy and was given a prescription for Neurontin but continues to have neuropathic pain.  He continues to have complaints of intermittent chest pain, ROWE, and significant bilateral lower extremity neuropathic pain.  \par \par

## 2020-02-10 NOTE — ASSESSMENT
[FreeTextEntry1] : 1. CAD: diagnosed via EBT, Ca+ score 29.4, 81st percentile (08/26/15), s/p mid LAD MI with PCI x 2 (05/25/18), s/p 03/04/19: exercise stress echo: EF 60%, akinesis apical anterior, apical septum, and apex at rest, no inducible ischemia and normal PA pressures post 10 METS. \par             - will pursue aggressive medical management and lifestyle intervention\par             - continue atorvastatin 80mg po daily for maximal CV risk reduction\par             - continue DAPT with ASA and clopidogrel lifelong as tolerated\par             - will send for an echocardiogram to evaluate LV function \par \par 2. Tobacco abuse counseling: not tolerant of Chantix (bad dreams)\par             - has not smoked since 05/25/18\par             - d/w importance of strict abstinence from tobacco\par  \par 3. Systolic heart failure: EF normalized: s/p echo: 11/30/18: EF 58%, akinesis apical septum and apex, trace MR/TR.\par             - continue Toprol XL 50mg po daily\par             - continue losartan 50mg po daily\par \par 4. Cervical and lumbar disc disease: + neuropathy\par             - continue follow up with neurologist\par \par 5. Carotid artery disease: mild \par             - will pursue medical management

## 2020-02-27 ENCOUNTER — APPOINTMENT (OUTPATIENT)
Dept: HEART AND VASCULAR | Facility: CLINIC | Age: 45
End: 2020-02-27
Payer: COMMERCIAL

## 2020-02-27 PROCEDURE — 93306 TTE W/DOPPLER COMPLETE: CPT

## 2020-04-21 ENCOUNTER — APPOINTMENT (OUTPATIENT)
Dept: HEART AND VASCULAR | Facility: CLINIC | Age: 45
End: 2020-04-21
Payer: COMMERCIAL

## 2020-04-21 PROCEDURE — 99215 OFFICE O/P EST HI 40 MIN: CPT

## 2020-04-21 PROCEDURE — 93351 STRESS TTE COMPLETE: CPT

## 2020-04-21 PROCEDURE — 93000 ELECTROCARDIOGRAM COMPLETE: CPT | Mod: 59

## 2020-04-22 NOTE — PHYSICAL EXAM
[General Appearance - Well Developed] : well developed [Normal Appearance] : normal appearance [Well Groomed] : well groomed [General Appearance - Well Nourished] : well nourished [No Deformities] : no deformities [General Appearance - In No Acute Distress] : no acute distress [Normal Conjunctiva] : the conjunctiva exhibited no abnormalities [Eyelids - No Xanthelasma] : the eyelids demonstrated no xanthelasmas [Normal Oral Mucosa] : normal oral mucosa [No Oral Cyanosis] : no oral cyanosis [No Oral Pallor] : no oral pallor [Normal Jugular Venous A Waves Present] : normal jugular venous A waves present [Normal Jugular Venous V Waves Present] : normal jugular venous V waves present [No Jugular Venous Miller A Waves] : no jugular venous miller A waves [Respiration, Rhythm And Depth] : normal respiratory rhythm and effort [Auscultation Breath Sounds / Voice Sounds] : lungs were clear to auscultation bilaterally [Exaggerated Use Of Accessory Muscles For Inspiration] : no accessory muscle use [Murmurs] : no murmurs present [Heart Rate And Rhythm] : heart rate and rhythm were normal [Heart Sounds] : normal S1 and S2 [Cyanosis, Localized] : no localized cyanosis [Petechial Hemorrhages (___cm)] : no petechial hemorrhages [Nail Clubbing] : no clubbing of the fingernails [Skin Color & Pigmentation] : normal skin color and pigmentation [Skin Lesions] : no skin lesions [No Venous Stasis] : no venous stasis [] : no rash [Oriented To Time, Place, And Person] : oriented to person, place, and time [No Skin Ulcers] : no skin ulcer [No Xanthoma] : no  xanthoma was observed [No Anxiety] : not feeling anxious [Mood] : the mood was normal [Affect] : the affect was normal

## 2020-04-22 NOTE — REASON FOR VISIT
[Follow-Up - Clinic] : a clinic follow-up of [Chest Pain] : chest pain [Coronary Artery Disease] : coronary artery disease [Hyperlipidemia] : hyperlipidemia [Hypertension] : hypertension [FreeTextEntry1] : 44 year old man with history of known CAD s/p PCI in 2018 presents for an acute visit secondary to chest discomfort. The discomfort was noted to be left sided. Symptoms present for a few days and are stable and intermittent in nature; lasting for a few minutes and longer. In addition, a possible associated positional component noted. His last ischemic work up was about a year ago. He is compliant with his cardiac medications and denies new toxic habits.

## 2020-04-22 NOTE — DISCUSSION/SUMMARY
[FreeTextEntry1] : CAD cont DAPT\par HLD cont statin and vascepa\par Chest pain, atypical, ekg reviewed. Patient underwent a stress echocardiogram today. Results, personally reviewed by me show regional wall motion abnormalities as were noted on prior study. No new findings noted. As to the stress part, his exercise time is slightly better than prior study with no evidence to new stress induced ischemia. These findings are reassuring. Patient to schedule a tele-health follow up with Dr Piña. He is to call the office if new or worsening symptoms noted.\par Emotional support provided.\par

## 2020-04-28 ENCOUNTER — APPOINTMENT (OUTPATIENT)
Dept: HEART AND VASCULAR | Facility: CLINIC | Age: 45
End: 2020-04-28
Payer: COMMERCIAL

## 2020-04-28 ENCOUNTER — APPOINTMENT (OUTPATIENT)
Dept: HEART AND VASCULAR | Facility: CLINIC | Age: 45
End: 2020-04-28

## 2020-04-28 VITALS — BODY MASS INDEX: 35 KG/M2 | HEIGHT: 71 IN | WEIGHT: 250 LBS

## 2020-04-28 PROCEDURE — 99214 OFFICE O/P EST MOD 30 MIN: CPT | Mod: 95

## 2020-04-28 NOTE — REVIEW OF SYSTEMS
[Lower Ext Edema] : no extremity edema [Dyspnea on exertion] : not dyspnea during exertion [Leg Claudication] : no intermittent leg claudication [Cough] : no cough [Skin Lesions] : no skin lesions

## 2020-04-28 NOTE — REASON FOR VISIT
[FreeTextEntry1] : Diagnostic Tests:\par --------------------------------\par ECG:\par 08/26/19: NSR, anteroseptal MI. \par 11/14/18: NSR, anteroseptal MI. \par 05/29/18: NSR, anteroseptal MI. \par 02/26/18: NSR, normal ECG.\par 10/14/15: NSR, normal ECG.\par ---------------------------------\par CT:\par 09/19/19: CTA head and neck: minimal carotid plaque b/l, no right carotid dissection. \par 08/26/15: EBT Ca+ score: 29, 81st percentile\par ---------------------------------\par Stress:\par 04/21/20: exercise stress echo: EF 59%, resting akinesis apical septum, apical inferior, apical anterior, and apex, no inducible ischemia post 10 METS. \par 03/04/19: exercise stress echo: EF 60%, akinesis apical anterior, apical septum, and apex at rest, no inducible ischemia and normal PA pressure post 10 METS. \par 02/19/19: ETT only cardiac rehab: 7.5 METS, normal ECG. \par 11/05/15: exercise stress echo: EF 64%, 10 METS, normal wall motion and PA pressures.\par ---------------------------------\par Cath:\par 05/25/18: at [place]: acute NSTEMI LAD: mid % (PCI x 2), otherwise mild disease\par ---------------------------------\par Echo:\par 02/27/20: EF 68%, akinesis apical septum, apical inferior, apical anterior, and apex. \par 11/30/18: EF 58%, akinesis apical septum and apex, trace MR/TR.\par 06/28/18: EF 48%, grade I diastolic dysfunction, apical septal and apical akinesis, trace MR/TR. \par 05/29/18: EF 35%, large apical MI, mild LVH, trace MR/TR.\par ---------------------------------\par Carotid:\par 09/10/19: sono: b/l prox ICA 1-19%, possible RCCA dissection flap\par

## 2020-04-28 NOTE — ASSESSMENT
[FreeTextEntry1] : 1. CAD: diagnosed via EBT, Ca+ score 29.4, 81st percentile (08/26/15), s/p mid LAD MI with PCI x 2 (05/25/18), s/p 04/21/20: exercise stress echo: normal EF 59%, resting akinesis apical septum, apical inferior, apical anterior, and apex, no inducible ischemia post 10 METS. \par             - will pursue aggressive medical management and lifestyle intervention\par             - continue atorvastatin 80mg po daily for maximal CV risk reduction\par             - continue DAPT with ASA and clopidogrel lifelong as tolerated\par \par 2. Tobacco abuse counseling: not tolerant of Chantix (bad dreams)\par             - has not smoked since 05/25/18\par             - d/w importance of strict abstinence from tobacco\par  \par 3. Systolic heart failure: EF normalized: s/p echo: 11/30/18: EF 58%, akinesis apical septum and apex, trace MR/TR.\par             - continue Toprol XL 50mg po daily\par             - continue losartan 50mg po daily\par \par 4. Cervical and lumbar disc disease: + neuropathy\par             - continue follow up with neurologist\par \par 5. Carotid artery disease: mild \par             - will pursue medical management\par \par Due to the current global COVID-19 pandemic and the recommendations for social distancing this encounter was converted from an in-person face-to-face encounter to a telehealth encounter employing the [employer] (or other approved) audio/video platform.  All components of the evaluation and management were performed per clinical routine with the exception of the physical exam.  The physical exam references my most recent physical exam plus any additional information provided by the patient (i.e. ambulatory vitals/weight) or inspection from the video portion of the encounter.  I spent in excess of 15 minutes (level 3), 25 minutes (level 4), 40 minutes (level 5) on the encounter.  \par \par Verbal consent was given on 04/21/20 at 3:00pm by Jatinder Mott. \par  PAIN/TENDERNESS/BACK PAIN/STIFFNESS

## 2020-04-28 NOTE — HISTORY OF PRESENT ILLNESS
[FreeTextEntry1] : Mr. Mott presents for follow up and management of CAD s/p apical MI with PCI LAD 05/25/18, dyslipidemia, overweight, and tobacco use.   As part of a union contract, he had a "heart scan" via EBT at [facility] on 08/26/15 which revealed a CA+ score of 29 (81st percentile).  He had an exercise stress echocardiogram on 11/05/15 which revealed an EF of 64%, 10 METS, normal wall motion, and normal PA pressures.  He has an extensive tobacco use history (1 PPD since his teenage years).  On 5/25/18 he began to experience substernal chest pain which he described as " an elephant sitting on his chest" associated with SOB.  He called 911 and was taken to [facility], where he was diagnosed with an acute non-ST elevation myocardial infarction and underwent invasive coronary angiography revealing a 100% thrombotic occlusion of the mid LAD with otherwise mild disease.  He went on to have PCI x 2 to his mid LAD with a good result.  (Joshua Schmitt -[phone], fax [phone]).  He had an echocardiogram on 05/29/18 which revealed an EF of 35%, a large apical MI, and no significant valvular disease.  He completed cardiac rehab at [facility].  He had a cough on lisinopril and was switched to losartan 50mg po daily.   He had a repeat echocardiogram on 11/30/18 which revealed an EF of 58%, akinesis apical septum and apex, and trace MR/TR.  He had an exercise stress echocardiogram on 03/04/19 which reveled an EF of 60%, akinesis apical anterior, apical septum, and apex at rest, no inducible ischemia and normal PA pressures post 10 METS.   We had a discussion about the duration of DAPT therapy being long-term unless bleeding occurs.  In addition, we discussed the results of the TRED-HF trial and agreed to remain on Toprol and losartan despite normalization of his ejection fraction.  He was evaluated by a neurologist for cervical and lumbar spinal neuropathy and was given a prescription for Neurontin but continues to have neuropathic pain.  He continues to have complaints of intermittent chest pain, ROWE, and significant bilateral lower extremity neuropathic pain.  He had an exercise stress echocardiogram on 04/21/20 which revealed an EF of 59%, resting akinesis apical septum, apical inferior, apical anterior, and apex, and no inducible ischemia post 10 METS.  At present, his atypical chest pain has improved.  \par \par

## 2020-05-11 ENCOUNTER — RX RENEWAL (OUTPATIENT)
Age: 45
End: 2020-05-11

## 2020-05-11 ENCOUNTER — APPOINTMENT (OUTPATIENT)
Dept: HEART AND VASCULAR | Facility: CLINIC | Age: 45
End: 2020-05-11

## 2020-06-12 ENCOUNTER — RX RENEWAL (OUTPATIENT)
Age: 45
End: 2020-06-12

## 2020-06-14 PROBLEM — R07.89 ATYPICAL CHEST PAIN: Status: RESOLVED | Noted: 2020-04-22 | Resolved: 2020-06-14

## 2020-06-16 ENCOUNTER — NON-APPOINTMENT (OUTPATIENT)
Age: 45
End: 2020-06-16

## 2020-06-16 ENCOUNTER — APPOINTMENT (OUTPATIENT)
Dept: HEART AND VASCULAR | Facility: CLINIC | Age: 45
End: 2020-06-16
Payer: COMMERCIAL

## 2020-06-16 VITALS
TEMPERATURE: 98.1 F | HEART RATE: 83 BPM | BODY MASS INDEX: 35 KG/M2 | WEIGHT: 250 LBS | SYSTOLIC BLOOD PRESSURE: 111 MMHG | HEIGHT: 71 IN | OXYGEN SATURATION: 98 % | DIASTOLIC BLOOD PRESSURE: 70 MMHG

## 2020-06-16 DIAGNOSIS — R07.89 OTHER CHEST PAIN: ICD-10-CM

## 2020-06-16 DIAGNOSIS — Z00.00 ENCOUNTER FOR GENERAL ADULT MEDICAL EXAMINATION W/OUT ABNORMAL FINDINGS: ICD-10-CM

## 2020-06-16 PROCEDURE — 99214 OFFICE O/P EST MOD 30 MIN: CPT | Mod: 25

## 2020-06-16 PROCEDURE — 93000 ELECTROCARDIOGRAM COMPLETE: CPT

## 2020-06-16 NOTE — REASON FOR VISIT
[Follow-Up - Clinic] : a clinic follow-up of [FreeTextEntry1] : Diagnostic Tests:\par --------------------------------\par ECG:\par 06/16/20: sinus rhythm, old anterior MI. \par 08/26/19: NSR, anteroseptal MI. \par 11/14/18: NSR, anteroseptal MI. \par 05/29/18: NSR, anteroseptal MI. \par 02/26/18: NSR, normal ECG.\par 10/14/15: NSR, normal ECG.\par ---------------------------------\par CT:\par 09/19/19: CTA head and neck: minimal carotid plaque b/l, no right carotid dissection. \par 08/26/15: EBT Ca+ score: 29, 81st percentile\par ---------------------------------\par Stress:\par 04/21/20: exercise stress echo: EF 59%, resting akinesis apical septum, apical inferior, apical anterior, and apex, no inducible ischemia post 10 METS. \par 03/04/19: exercise stress echo: EF 60%, akinesis apical anterior, apical septum, and apex at rest, no inducible ischemia and normal PA pressure post 10 METS. \par 02/19/19: ETT only cardiac rehab: 7.5 METS, normal ECG. \par 11/05/15: exercise stress echo: EF 64%, 10 METS, normal wall motion and PA pressures.\par ---------------------------------\par Cath:\par 05/25/18: at [facility]: acute NSTEMI LAD: mid % (PCI x 2), otherwise mild disease\par ---------------------------------\par Echo:\par 02/27/20: EF 68%, akinesis apical septum, apical inferior, apical anterior, and apex. \par 11/30/18: EF 58%, akinesis apical septum and apex, trace MR/TR.\par 06/28/18: EF 48%, grade I diastolic dysfunction, apical septal and apical akinesis, trace MR/TR. \par 05/29/18: EF 35%, large apical MI, mild LVH, trace MR/TR.\par ---------------------------------\par Carotid:\par 09/10/19: sono: b/l prox ICA 1-19%, possible RCCA dissection flap\par

## 2020-06-16 NOTE — PHYSICAL EXAM
[General Appearance - Well Developed] : well developed [Well Groomed] : well groomed [Normal Appearance] : normal appearance [No Deformities] : no deformities [General Appearance - In No Acute Distress] : no acute distress [General Appearance - Well Nourished] : well nourished [Normal Conjunctiva] : the conjunctiva exhibited no abnormalities [No Oral Pallor] : no oral pallor [Normal Oral Mucosa] : normal oral mucosa [Eyelids - No Xanthelasma] : the eyelids demonstrated no xanthelasmas [No Oral Cyanosis] : no oral cyanosis [Normal Jugular Venous A Waves Present] : normal jugular venous A waves present [Normal Jugular Venous V Waves Present] : normal jugular venous V waves present [No Jugular Venous Miller A Waves] : no jugular venous miller A waves [Respiration, Rhythm And Depth] : normal respiratory rhythm and effort [Exaggerated Use Of Accessory Muscles For Inspiration] : no accessory muscle use [Auscultation Breath Sounds / Voice Sounds] : lungs were clear to auscultation bilaterally [Heart Sounds] : normal S1 and S2 [Heart Rate And Rhythm] : heart rate and rhythm were normal [Murmurs] : no murmurs present [Abdomen Tenderness] : non-tender [Abdomen Soft] : soft [Abdomen Mass (___ Cm)] : no abdominal mass palpated [Gait - Sufficient For Exercise Testing] : the gait was sufficient for exercise testing [Abnormal Walk] : normal gait [Nail Clubbing] : no clubbing of the fingernails [Cyanosis, Localized] : no localized cyanosis [Petechial Hemorrhages (___cm)] : no petechial hemorrhages [Skin Color & Pigmentation] : normal skin color and pigmentation [] : no rash [Skin Lesions] : no skin lesions [No Venous Stasis] : no venous stasis [No Xanthoma] : no  xanthoma was observed [No Skin Ulcers] : no skin ulcer [Oriented To Time, Place, And Person] : oriented to person, place, and time [Affect] : the affect was normal [Mood] : the mood was normal [No Anxiety] : not feeling anxious [FreeTextEntry1] : + ecchymosis

## 2020-06-16 NOTE — ASSESSMENT
[FreeTextEntry1] : 1. CAD: diagnosed via EBT, Ca+ score 29.4, 81st percentile (08/26/15), s/p mid LAD MI with PCI x 2 (05/25/18), s/p 04/21/20: exercise stress echo: normal EF 59%, resting akinesis apical septum, apical inferior, apical anterior, and apex, no inducible ischemia post 10 METS. \par             - will pursue aggressive medical management and lifestyle intervention\par             - continue atorvastatin 80mg po daily for maximal CV risk reduction\par             - continue DAPT with ASA and clopidogrel lifelong as tolerated\par \par 2. Tobacco abuse counseling: not tolerant of Chantix (bad dreams)\par             - has not smoked since 05/25/18\par             - d/w importance of strict abstinence from tobacco\par  \par 3. Systolic heart failure: EF normalized: s/p echo: 11/30/18: EF 58%, akinesis apical septum and apex, trace MR/TR.\par             - continue Toprol XL 50mg po daily\par             - continue losartan 50mg po daily\par \par 4. Cervical and lumbar disc disease: + neuropathy\par             - continue follow up with neurologist\par \par 5. Carotid artery disease: mild \par             - will pursue medical management\par \par

## 2020-06-16 NOTE — REVIEW OF SYSTEMS
[Shortness Of Breath] : shortness of breath [Chest Pain] : chest pain [Palpitations] : palpitations [see HPI] : see HPI [Heartburn] : heartburn [Negative] : Heme/Lymph [Dyspnea on exertion] : not dyspnea during exertion [Leg Claudication] : no intermittent leg claudication [Lower Ext Edema] : no extremity edema [Cough] : no cough [Skin Lesions] : no skin lesions

## 2020-06-16 NOTE — HISTORY OF PRESENT ILLNESS
[FreeTextEntry1] : Mr. Mott presents for follow up and management of CAD s/p apical MI with PCI LAD 05/25/18, dyslipidemia, overweight, and tobacco use.   As part of a union contract, he had a "heart scan" via EBT at [facility] on 08/26/15 which revealed a CA+ score of 29 (81st percentile).  He had an exercise stress echocardiogram on 11/05/15 which revealed an EF of 64%, 10 METS, normal wall motion, and normal PA pressures.  He has an extensive tobacco use history (1 PPD since his teenage years).  On 5/25/18 he began to experience substernal chest pain which he described as " an elephant sitting on his chest" associated with SOB.  He called 911 and was taken to [facility], where he was diagnosed with an acute non-ST elevation myocardial infarction and underwent invasive coronary angiography revealing a 100% thrombotic occlusion of the mid LAD with otherwise mild disease.  He went on to have PCI x 2 to his mid LAD with a good result.  (Joshua Schmitt -[phone], fax [phone]).  He had an echocardiogram on 05/29/18 which revealed an EF of 35%, a large apical MI, and no significant valvular disease.  He completed cardiac rehab at [facility].  He had a cough on lisinopril and was switched to losartan 50mg po daily.   He had a repeat echocardiogram on 11/30/18 which revealed an EF of 58%, akinesis apical septum and apex, and trace MR/TR.  He had an exercise stress echocardiogram on 03/04/19 which reveled an EF of 60%, akinesis apical anterior, apical septum, and apex at rest, no inducible ischemia and normal PA pressures post 10 METS.   We had a discussion about the duration of DAPT therapy being long-term unless bleeding occurs.  In addition, we discussed the results of the TRED-HF trial and agreed to remain on Toprol and losartan despite normalization of his ejection fraction.  He was evaluated by a neurologist for cervical and lumbar spinal neuropathy and was given a prescription for Neurontin but continues to have neuropathic pain.  He continues to have complaints of intermittent chest pain, ROWE, and occasional bilateral lower extremity neuropathic pain.  He had an exercise stress echocardiogram on 04/21/20 which revealed an EF of 59%, resting akinesis apical septum, apical inferior, apical anterior, and apex, and no inducible ischemia post 10 METS.  At present, his atypical chest pain has improved.  He is doing well during the COVID crisis. \par \par

## 2020-06-17 LAB
ALBUMIN SERPL ELPH-MCNC: 4.9 G/DL
ALP BLD-CCNC: 139 U/L
ALT SERPL-CCNC: 67 U/L
ANION GAP SERPL CALC-SCNC: 14 MMOL/L
AST SERPL-CCNC: 42 U/L
BASOPHILS # BLD AUTO: 0.05 K/UL
BASOPHILS NFR BLD AUTO: 0.6 %
BILIRUB SERPL-MCNC: 0.6 MG/DL
BUN SERPL-MCNC: 14 MG/DL
CALCIUM SERPL-MCNC: 9.3 MG/DL
CHLORIDE SERPL-SCNC: 102 MMOL/L
CHOLEST SERPL-MCNC: 115 MG/DL
CHOLEST/HDLC SERPL: 4.8 RATIO
CO2 SERPL-SCNC: 23 MMOL/L
CREAT SERPL-MCNC: 1.02 MG/DL
EOSINOPHIL # BLD AUTO: 0.1 K/UL
EOSINOPHIL NFR BLD AUTO: 1.3 %
ESTIMATED AVERAGE GLUCOSE: 111 MG/DL
GLUCOSE SERPL-MCNC: 113 MG/DL
HBA1C MFR BLD HPLC: 5.5 %
HCT VFR BLD CALC: 51.8 %
HDLC SERPL-MCNC: 24 MG/DL
HGB BLD-MCNC: 16.4 G/DL
IMM GRANULOCYTES NFR BLD AUTO: 0.5 %
LDLC SERPL CALC-MCNC: 52 MG/DL
LDLC SERPL DIRECT ASSAY-MCNC: 61 MG/DL
LYMPHOCYTES # BLD AUTO: 1.75 K/UL
LYMPHOCYTES NFR BLD AUTO: 22.4 %
MAN DIFF?: NORMAL
MCHC RBC-ENTMCNC: 30.7 PG
MCHC RBC-ENTMCNC: 31.7 GM/DL
MCV RBC AUTO: 97 FL
MONOCYTES # BLD AUTO: 0.62 K/UL
MONOCYTES NFR BLD AUTO: 7.9 %
NEUTROPHILS # BLD AUTO: 5.25 K/UL
NEUTROPHILS NFR BLD AUTO: 67.3 %
PLATELET # BLD AUTO: 319 K/UL
POTASSIUM SERPL-SCNC: 4.5 MMOL/L
PROT SERPL-MCNC: 7.6 G/DL
RBC # BLD: 5.34 M/UL
RBC # FLD: 12.7 %
SARS-COV-2 IGG SERPL IA-ACNC: <3.8 AU/ML
SARS-COV-2 IGG SERPL QL IA: NEGATIVE
SODIUM SERPL-SCNC: 138 MMOL/L
TRIGL SERPL-MCNC: 197 MG/DL
TSH SERPL-ACNC: 1.26 UIU/ML
WBC # FLD AUTO: 7.81 K/UL

## 2020-07-01 ENCOUNTER — APPOINTMENT (OUTPATIENT)
Dept: GASTROENTEROLOGY | Facility: CLINIC | Age: 45
End: 2020-07-01
Payer: COMMERCIAL

## 2020-07-01 VITALS
WEIGHT: 245 LBS | DIASTOLIC BLOOD PRESSURE: 80 MMHG | HEART RATE: 79 BPM | OXYGEN SATURATION: 96 % | TEMPERATURE: 98.4 F | BODY MASS INDEX: 34.3 KG/M2 | HEIGHT: 71 IN | SYSTOLIC BLOOD PRESSURE: 129 MMHG

## 2020-07-01 PROCEDURE — 99204 OFFICE O/P NEW MOD 45 MIN: CPT

## 2020-07-02 LAB
A1AT SERPL-MCNC: 160 MG/DL
CERULOPLASMIN SERPL-MCNC: 23 MG/DL
FERRITIN SERPL-MCNC: 267 NG/ML
HBV CORE IGG+IGM SER QL: NONREACTIVE
HBV CORE IGM SER QL: NONREACTIVE
HBV SURFACE AB SER QL: ABNORMAL
HBV SURFACE AG SER QL: NONREACTIVE
HCV AB SER QL: NONREACTIVE
HCV S/CO RATIO: 0.14 S/CO
IGA SER QL IEP: 448 MG/DL
IRON SATN MFR SERPL: 39 %
IRON SERPL-MCNC: 110 UG/DL
TIBC SERPL-MCNC: 280 UG/DL
TRANSFERRIN SERPL-MCNC: 240 MG/DL
UIBC SERPL-MCNC: 170 UG/DL

## 2020-07-06 LAB
FIBROSIS SCORE: 0.13
FIBROSIS STAGE: NORMAL
FIBROSURE ALPHA 2 MACROGLOBULINS: 108 MG/DL
FIBROSURE ALT (SGPT): 64 IU/L
FIBROSURE APOLIPOPROTEIN A1: 72 MG/DL
FIBROSURE COMMENT 2: NORMAL
FIBROSURE GGT: 24 IU/L
FIBROSURE HAPTOGLOBIN: 163 MG/DL
FIBROSURE INTERPRETATIONS: NORMAL
FIBROSURE LIMITATIONS: NORMAL
FIBROSURE NECROINFLAMM ACTIVITY SCORING: NORMAL
FIBROSURE NECROINFLAMMAT ACTIVITY GRPFIBROSURE NECROINFLAMMA: NORMAL
FIBROSURE NECROINFLAMMAT ACTIVITY SCORE: 0.32
FIBROSURE SCORING: NORMAL
FIBROSURE TOTAL BILIRUBIN: 0.5 MG/DL
HBV E AB SER QL: NEGATIVE
HBV E AG SER QL: NEGATIVE
MITOCHONDRIA AB SER IF-ACNC: NORMAL
SMOOTH MUSCLE AB SER QL IF: NORMAL
TTG IGA SER IA-ACNC: >100 U/ML
TTG IGA SER-ACNC: POSITIVE

## 2020-07-07 LAB — ANA SER IF-ACNC: NEGATIVE

## 2020-07-08 LAB
C DIFF TOX GENS STL QL NAA+PROBE: NORMAL
CDIFF BY PCR: NOT DETECTED
GI PCR PANEL, STOOL: NORMAL

## 2020-07-09 LAB — DEPRECATED O AND P PREP STL: NORMAL

## 2020-07-16 ENCOUNTER — APPOINTMENT (OUTPATIENT)
Dept: GASTROENTEROLOGY | Facility: CLINIC | Age: 45
End: 2020-07-16
Payer: COMMERCIAL

## 2020-07-16 ENCOUNTER — APPOINTMENT (OUTPATIENT)
Dept: ULTRASOUND IMAGING | Facility: CLINIC | Age: 45
End: 2020-07-16
Payer: COMMERCIAL

## 2020-07-16 ENCOUNTER — OUTPATIENT (OUTPATIENT)
Dept: OUTPATIENT SERVICES | Facility: HOSPITAL | Age: 45
LOS: 1 days | End: 2020-07-16

## 2020-07-16 PROCEDURE — 99443: CPT

## 2020-07-16 PROCEDURE — 76700 US EXAM ABDOM COMPLETE: CPT | Mod: 26

## 2020-07-25 ENCOUNTER — LABORATORY RESULT (OUTPATIENT)
Age: 45
End: 2020-07-25

## 2020-07-28 ENCOUNTER — APPOINTMENT (OUTPATIENT)
Dept: GASTROENTEROLOGY | Facility: HOSPITAL | Age: 45
End: 2020-07-28

## 2020-07-28 ENCOUNTER — RESULT REVIEW (OUTPATIENT)
Age: 45
End: 2020-07-28

## 2020-07-28 ENCOUNTER — OUTPATIENT (OUTPATIENT)
Dept: OUTPATIENT SERVICES | Facility: HOSPITAL | Age: 45
LOS: 1 days | Discharge: HOME | End: 2020-07-28
Payer: COMMERCIAL

## 2020-07-28 PROCEDURE — 45380 COLONOSCOPY AND BIOPSY: CPT | Mod: XS

## 2020-07-28 PROCEDURE — 88341 IMHCHEM/IMCYTCHM EA ADD ANTB: CPT | Mod: 26

## 2020-07-28 PROCEDURE — 43239 EGD BIOPSY SINGLE/MULTIPLE: CPT

## 2020-07-28 PROCEDURE — 88341 IMHCHEM/IMCYTCHM EA ADD ANTB: CPT

## 2020-07-28 PROCEDURE — 88342 IMHCHEM/IMCYTCHM 1ST ANTB: CPT | Mod: 26

## 2020-07-28 PROCEDURE — 88305 TISSUE EXAM BY PATHOLOGIST: CPT | Mod: 26

## 2020-07-28 PROCEDURE — 45390 COLONOSCOPY W/RESECTION: CPT

## 2020-07-28 PROCEDURE — C1889: CPT

## 2020-07-28 PROCEDURE — 88305 TISSUE EXAM BY PATHOLOGIST: CPT

## 2020-07-30 LAB — SURGICAL PATHOLOGY STUDY: SIGNIFICANT CHANGE UP

## 2020-07-31 DIAGNOSIS — K29.80 DUODENITIS WITHOUT BLEEDING: ICD-10-CM

## 2020-07-31 DIAGNOSIS — Z11.59 ENCOUNTER FOR SCREENING FOR OTHER VIRAL DISEASES: ICD-10-CM

## 2020-07-31 DIAGNOSIS — R10.9 UNSPECIFIED ABDOMINAL PAIN: ICD-10-CM

## 2020-07-31 DIAGNOSIS — D12.2 BENIGN NEOPLASM OF ASCENDING COLON: ICD-10-CM

## 2020-07-31 DIAGNOSIS — K29.50 UNSPECIFIED CHRONIC GASTRITIS WITHOUT BLEEDING: ICD-10-CM

## 2020-08-03 ENCOUNTER — APPOINTMENT (OUTPATIENT)
Dept: GASTROENTEROLOGY | Facility: CLINIC | Age: 45
End: 2020-08-03
Payer: COMMERCIAL

## 2020-08-03 PROCEDURE — 99442: CPT

## 2020-08-05 ENCOUNTER — APPOINTMENT (OUTPATIENT)
Dept: GASTROENTEROLOGY | Facility: CLINIC | Age: 45
End: 2020-08-05
Payer: COMMERCIAL

## 2020-08-05 ENCOUNTER — LABORATORY RESULT (OUTPATIENT)
Age: 45
End: 2020-08-05

## 2020-08-05 PROCEDURE — 36415 COLL VENOUS BLD VENIPUNCTURE: CPT

## 2020-08-09 LAB
ACE BLD-CCNC: 33 U/L
ANION GAP SERPL CALC-SCNC: 14 MMOL/L
BUN SERPL-MCNC: 11 MG/DL
CALCIUM SERPL-MCNC: 9.2 MG/DL
CHLORIDE SERPL-SCNC: 103 MMOL/L
CO2 SERPL-SCNC: 22 MMOL/L
CREAT SERPL-MCNC: 0.93 MG/DL
GLUCOSE SERPL-MCNC: 100 MG/DL
M TB IFN-G BLD-IMP: NEGATIVE
POTASSIUM SERPL-SCNC: 4.5 MMOL/L
QUANTIFERON TB PLUS MITOGEN MINUS NIL: 8.78 IU/ML
QUANTIFERON TB PLUS NIL: 0.02 IU/ML
QUANTIFERON TB PLUS TB1 MINUS NIL: 0 IU/ML
QUANTIFERON TB PLUS TB2 MINUS NIL: 0 IU/ML
SODIUM SERPL-SCNC: 139 MMOL/L

## 2020-08-11 LAB
CA-I SERPL-SCNC: 1.21 MMOL/L
IGG4 SER-MCNC: 58.1 MG/DL

## 2020-08-16 PROBLEM — Z87.11 HISTORY OF PEPTIC ULCER: Status: RESOLVED | Noted: 2020-07-28 | Resolved: 2020-08-16

## 2020-08-16 PROBLEM — Z20.828 EXPOSURE TO COVID-19 VIRUS: Status: RESOLVED | Noted: 2020-06-16 | Resolved: 2020-08-16

## 2020-08-16 PROBLEM — Z86.010 HISTORY OF COLON POLYPS: Status: RESOLVED | Noted: 2020-07-18 | Resolved: 2020-08-16

## 2020-08-17 ENCOUNTER — LABORATORY RESULT (OUTPATIENT)
Age: 45
End: 2020-08-17

## 2020-08-18 ENCOUNTER — APPOINTMENT (OUTPATIENT)
Dept: HEART AND VASCULAR | Facility: CLINIC | Age: 45
End: 2020-08-18
Payer: COMMERCIAL

## 2020-08-18 ENCOUNTER — APPOINTMENT (OUTPATIENT)
Dept: GASTROENTEROLOGY | Facility: CLINIC | Age: 45
End: 2020-08-18
Payer: COMMERCIAL

## 2020-08-18 VITALS
BODY MASS INDEX: 34.44 KG/M2 | DIASTOLIC BLOOD PRESSURE: 77 MMHG | HEART RATE: 69 BPM | SYSTOLIC BLOOD PRESSURE: 118 MMHG | OXYGEN SATURATION: 96 % | HEIGHT: 71 IN | WEIGHT: 246 LBS

## 2020-08-18 DIAGNOSIS — Z20.828 CONTACT WITH AND (SUSPECTED) EXPOSURE TO OTHER VIRAL COMMUNICABLE DISEASES: ICD-10-CM

## 2020-08-18 DIAGNOSIS — Z87.11 PERSONAL HISTORY OF PEPTIC ULCER DISEASE: ICD-10-CM

## 2020-08-18 DIAGNOSIS — Z86.010 PERSONAL HISTORY OF COLONIC POLYPS: ICD-10-CM

## 2020-08-18 PROCEDURE — 36415 COLL VENOUS BLD VENIPUNCTURE: CPT

## 2020-08-18 PROCEDURE — 99215 OFFICE O/P EST HI 40 MIN: CPT

## 2020-08-18 NOTE — HISTORY OF PRESENT ILLNESS
[FreeTextEntry1] : Mr. Mott presents for follow up and management of CAD s/p apical MI with PCI LAD 05/25/18, dyslipidemia, overweight, celiac disease, duodenal ulcers, and tobacco use.   As part of a union contract, he had a "heart scan" via EBT at [facility] on 08/26/15 which revealed a CA+ score of 29 (81st percentile).  He had an exercise stress echocardiogram on 11/05/15 which revealed an EF of 64%, 10 METS, normal wall motion, and normal PA pressures.  He has an extensive tobacco use history (1 PPD since his teenage years).  On 5/25/18 he began to experience substernal chest pain which he described as " an elephant sitting on his chest" associated with SOB.  He called 911 and was taken to [facility], where he was diagnosed with an acute non-ST elevation myocardial infarction and underwent invasive coronary angiography revealing a 100% thrombotic occlusion of the mid LAD with otherwise mild disease.  He went on to have PCI x 2 to his mid LAD with a good result.  (Joshua Schmitt [phone], fax [phone]).  He had an echocardiogram on 05/29/18 which revealed an EF of 35%, a large apical MI, and no significant valvular disease.  He completed cardiac rehab at [facility].  He had a cough on lisinopril and was switched to losartan 50mg po daily.   He had a repeat echocardiogram on 11/30/18 which revealed an EF of 58%, akinesis apical septum and apex, and trace MR/TR.  He had an exercise stress echocardiogram on 03/04/19 which reveled an EF of 60%, akinesis apical anterior, apical septum, and apex at rest, no inducible ischemia and normal PA pressures post 10 METS.   We had a discussion about the duration of DAPT therapy being long-term unless bleeding occurs.  In addition, we discussed the results of the TRED-HF trial and agreed to remain on Toprol and losartan despite normalization of his ejection fraction.  He was evaluated by a neurologist for cervical and lumbar spinal neuropathy and was given a prescription for Neurontin but continues to have neuropathic pain.  He continues to have complaints of intermittent chest pain, ROWE, and occasional bilateral lower extremity neuropathic pain.  He had an exercise stress echocardiogram on 04/21/20 which revealed an EF of 59%, resting akinesis apical septum, apical inferior, apical anterior, and apex, and no inducible ischemia post 10 METS.  At present, his atypical chest pain has improved.  He was recently diagnosed with duodenal ulcers and celiac disease. \par \par

## 2020-08-18 NOTE — REASON FOR VISIT
[Follow-Up - Clinic] : a clinic follow-up of [FreeTextEntry1] : CAD s/p apical MI with PCI LAD 05/25/18, dyslipidemia, overweight, celiac disease, duodenal ulcers, and tobacco use

## 2020-08-18 NOTE — PHYSICAL EXAM
[General Appearance - Well Developed] : well developed [Well Groomed] : well groomed [Normal Appearance] : normal appearance [General Appearance - Well Nourished] : well nourished [General Appearance - In No Acute Distress] : no acute distress [No Deformities] : no deformities [Normal Oral Mucosa] : normal oral mucosa [Eyelids - No Xanthelasma] : the eyelids demonstrated no xanthelasmas [Normal Conjunctiva] : the conjunctiva exhibited no abnormalities [No Oral Pallor] : no oral pallor [No Oral Cyanosis] : no oral cyanosis [Normal Jugular Venous V Waves Present] : normal jugular venous V waves present [Normal Jugular Venous A Waves Present] : normal jugular venous A waves present [Respiration, Rhythm And Depth] : normal respiratory rhythm and effort [No Jugular Venous Miller A Waves] : no jugular venous miller A waves [Exaggerated Use Of Accessory Muscles For Inspiration] : no accessory muscle use [Auscultation Breath Sounds / Voice Sounds] : lungs were clear to auscultation bilaterally [Heart Rate And Rhythm] : heart rate and rhythm were normal [Heart Sounds] : normal S1 and S2 [Murmurs] : no murmurs present [Abdomen Soft] : soft [Abdomen Tenderness] : non-tender [Abdomen Mass (___ Cm)] : no abdominal mass palpated [Gait - Sufficient For Exercise Testing] : the gait was sufficient for exercise testing [Abnormal Walk] : normal gait [Petechial Hemorrhages (___cm)] : no petechial hemorrhages [Cyanosis, Localized] : no localized cyanosis [Nail Clubbing] : no clubbing of the fingernails [Skin Color & Pigmentation] : normal skin color and pigmentation [Skin Lesions] : no skin lesions [No Venous Stasis] : no venous stasis [] : no rash [No Skin Ulcers] : no skin ulcer [No Xanthoma] : no  xanthoma was observed [Oriented To Time, Place, And Person] : oriented to person, place, and time [Affect] : the affect was normal [No Anxiety] : not feeling anxious [Mood] : the mood was normal [FreeTextEntry1] : + ecchymosis

## 2020-08-18 NOTE — REVIEW OF SYSTEMS
[Shortness Of Breath] : shortness of breath [Chest Pain] : chest pain [Palpitations] : palpitations [see HPI] : see HPI [Heartburn] : heartburn [Negative] : Heme/Lymph [Lower Ext Edema] : no extremity edema [Dyspnea on exertion] : not dyspnea during exertion [Leg Claudication] : no intermittent leg claudication [Skin Lesions] : no skin lesions [Cough] : no cough

## 2020-08-18 NOTE — ASSESSMENT
[FreeTextEntry1] : 1. CAD: diagnosed via EBT, Ca+ score 29.4, 81st percentile (08/26/15), s/p mid LAD MI with PCI x 2 (05/25/18), s/p 04/21/20: exercise stress echo: normal EF 59%, resting akinesis apical septum, apical inferior, apical anterior, and apex, no inducible ischemia post 10 METS. \par             - will pursue aggressive medical management and lifestyle intervention\par             - continue atorvastatin 80mg po daily for maximal CV risk reduction\par             - continue clopidogrel 75mg p QD\par             - will discontinue ASA 81mg po QD given multiple duodenal ulcers\par \par 2. Tobacco abuse counseling: not tolerant of Chantix (bad dreams)\par             - has not smoked since 05/25/18\par             - d/w importance of strict abstinence from tobacco\par  \par 3. Systolic heart failure: EF normalized: s/p echo: 11/30/18: EF 58%, akinesis apical septum and apex, trace MR/TR.\par             - continue Toprol XL 50mg po daily\par             - continue losartan 50mg po daily\par \par 4. Cervical and lumbar disc disease: + neuropathy\par             - continue follow up with neurologist\par \par 5. Carotid artery disease: mild \par             - will pursue medical management\par \par 6. Duodenal ulcers:\par            - d/c ASA as above\par            - continue omeprazole 40mg po QD\par

## 2020-08-26 ENCOUNTER — APPOINTMENT (OUTPATIENT)
Dept: HEART AND VASCULAR | Facility: CLINIC | Age: 45
End: 2020-08-26

## 2020-08-28 LAB
24R-OH-CALCIDIOL SERPL-MCNC: 84.1 PG/ML
25(OH)D3 SERPL-MCNC: 23.1 NG/ML
CALCIUM SERPL-MCNC: 9.2 MG/DL
PARATHYROID HORMONE INTACT: 81 PG/ML

## 2020-09-08 ENCOUNTER — APPOINTMENT (OUTPATIENT)
Dept: INTERNAL MEDICINE | Facility: CLINIC | Age: 45
End: 2020-09-08
Payer: COMMERCIAL

## 2020-09-08 VITALS — WEIGHT: 240 LBS | BODY MASS INDEX: 33.47 KG/M2

## 2020-09-08 PROCEDURE — 97802 MEDICAL NUTRITION INDIV IN: CPT

## 2020-10-07 ENCOUNTER — APPOINTMENT (OUTPATIENT)
Dept: GASTROENTEROLOGY | Facility: CLINIC | Age: 45
End: 2020-10-07

## 2020-10-12 ENCOUNTER — APPOINTMENT (OUTPATIENT)
Dept: HEART AND VASCULAR | Facility: CLINIC | Age: 45
End: 2020-10-12
Payer: COMMERCIAL

## 2020-10-12 VITALS
SYSTOLIC BLOOD PRESSURE: 112 MMHG | DIASTOLIC BLOOD PRESSURE: 71 MMHG | HEART RATE: 79 BPM | WEIGHT: 252 LBS | TEMPERATURE: 98 F | OXYGEN SATURATION: 98 % | HEIGHT: 71 IN | BODY MASS INDEX: 35.28 KG/M2

## 2020-10-12 PROCEDURE — 99214 OFFICE O/P EST MOD 30 MIN: CPT

## 2020-10-12 NOTE — ASSESSMENT
[FreeTextEntry1] : 1. CAD: diagnosed via EBT, Ca+ score 29.4, 81st percentile (08/26/15), s/p mid LAD MI with PCI x 2 (05/25/18), s/p 04/21/20: exercise stress echo: normal EF 59%, resting akinesis apical septum, apical inferior, apical anterior, and apex, post 10 METS. + stable exertional angina and ROWE\par             - will pursue aggressive medical management and lifestyle intervention\par             - continue atorvastatin 80mg po daily for maximal CV risk reduction\par             - continue clopidogrel 75mg po QD\par             - continue off ASA 81mg po QD given multiple duodenal ulcers\par \par 2. Tobacco abuse counseling: \par             - has not smoked since 05/25/18\par             - d/w importance of strict abstinence from tobacco\par  \par 3. Systolic heart failure: EF normalized: s/p echo: 11/30/18: EF 58%, akinesis apical septum and apex, trace MR/TR.\par             - continue Toprol XL 50mg po daily\par             - continue losartan 50mg po daily\par \par 4. Cervical and lumbar disc disease: + neuropathy\par             - continue follow up with neurologist\par \par 5. Carotid artery disease: mild \par             - will pursue medical management\par \par 6. Duodenal ulcers:\par            - continue off ASA as above\par

## 2020-10-12 NOTE — REVIEW OF SYSTEMS
[Chest Pain] : chest pain [see HPI] : see HPI [Heartburn] : heartburn [Negative] : Heme/Lymph [Shortness Of Breath] : no shortness of breath [Dyspnea on exertion] : dyspnea during exertion [Lower Ext Edema] : no extremity edema [Leg Claudication] : no intermittent leg claudication [Palpitations] : no palpitations [Cough] : no cough [Skin Lesions] : no skin lesions

## 2020-10-12 NOTE — REASON FOR VISIT
[Follow-Up - Clinic] : a clinic follow-up of [FreeTextEntry1] : Diagnostic Tests:\par --------------------------------\par ECG:\par 06/16/20: sinus rhythm, old anterior MI. \par 08/26/19: NSR, anteroseptal MI. \par 11/14/18: NSR, anteroseptal MI. \par 05/29/18: NSR, anteroseptal MI. \par 02/26/18: NSR, normal ECG.\par 10/14/15: NSR, normal ECG.\par ---------------------------------\par CT:\par 09/19/19: CTA head and neck: minimal carotid plaque b/l, no right carotid dissection. \par 08/26/15: EBT Ca+ score: 29, 81st percentile\par ---------------------------------\par Stress:\par 04/21/20: exercise stress echo: EF 59%, resting akinesis apical septum, apical inferior, apical anterior, and apex, post 10 METS. \par 03/04/19: exercise stress echo: EF 60%, akinesis apical anterior, apical septum, and apex at rest, no inducible ischemia and normal PA pressure post 10 METS. \par 02/19/19: ETT only cardiac rehab: 7.5 METS, normal ECG. \par 11/05/15: exercise stress echo: EF 64%, 10 METS, normal wall motion and PA pressures.\par ---------------------------------\par Cath:\par 05/25/18: at [facility]: acute NSTEMI LAD: mid % (PCI x 2), otherwise mild disease\par ---------------------------------\par Echo:\par 02/27/20: EF 68%, akinesis apical septum, apical inferior, apical anterior, and apex. \par 11/30/18: EF 58%, akinesis apical septum and apex, trace MR/TR.\par 06/28/18: EF 48%, grade I diastolic dysfunction, apical septal and apical akinesis, trace MR/TR. \par 05/29/18: EF 35%, large apical MI, mild LVH, trace MR/TR.\par ---------------------------------\par Carotid:\par 09/10/19: sono: b/l prox ICA 1-19%, possible RCCA dissection flap\par

## 2020-10-12 NOTE — HISTORY OF PRESENT ILLNESS
[FreeTextEntry1] : Mr. Mott presents for follow up and management of CAD s/p apical MI with PCI LAD 05/25/18, dyslipidemia, overweight, celiac disease, duodenal ulcers, and tobacco use.   As part of a union contract, he had a "heart scan" via EBT at [facility] on 08/26/15 which revealed a CA+ score of 29 (81st percentile).  He had an exercise stress echocardiogram on 11/05/15 which revealed an EF of 64%, 10 METS, normal wall motion, and normal PA pressures.  He has an extensive tobacco use history (1 PPD since his teenage years).  On 5/25/18 he began to experience substernal chest pain which he described as " an elephant sitting on his chest" associated with SOB.  He called 911 and was taken to [facility], where he was diagnosed with an acute non-ST elevation myocardial infarction and underwent invasive coronary angiography revealing a 100% thrombotic occlusion of the mid LAD with otherwise mild disease.  He went on to have PCI x 2 to his mid LAD with a good result.  (Joshua Schmitt -[phone], fax [phone]).  He had an echocardiogram on 05/29/18 which revealed an EF of 35%, a large apical MI, and no significant valvular disease.  He completed cardiac rehab at [facility].  He had a cough on lisinopril and was switched to losartan 50mg po daily.   He had a repeat echocardiogram on 11/30/18 which revealed an EF of 58%, akinesis apical septum and apex, and trace MR/TR.  He had an exercise stress echocardiogram on 03/04/19 which reveled an EF of 60%, akinesis apical anterior, apical septum, and apex at rest, no inducible ischemia and normal PA pressures post 10 METS.   We had a discussion about the duration of DAPT therapy being long-term unless bleeding occurs.  In addition, we discussed the results of the TRED-HF trial and agreed to remain on Toprol and losartan despite normalization of his ejection fraction.  He was evaluated by a neurologist for cervical and lumbar spinal neuropathy and was given a prescription for Neurontin but continues to have neuropathic pain.  He continues to have complaints of intermittent chest pain, ROWE, and occasional bilateral lower extremity neuropathic pain.  He had an exercise stress echocardiogram on 04/21/20 which revealed an EF of 59%, resting akinesis apical septum, apical inferior, apical anterior, and apex, post 10 METS.   At present, he has stable exertional angina and ROWE. \par \par

## 2020-10-15 ENCOUNTER — APPOINTMENT (OUTPATIENT)
Dept: ENDOCRINOLOGY | Facility: CLINIC | Age: 45
End: 2020-10-15
Payer: COMMERCIAL

## 2020-10-15 VITALS
SYSTOLIC BLOOD PRESSURE: 109 MMHG | DIASTOLIC BLOOD PRESSURE: 74 MMHG | BODY MASS INDEX: 34.73 KG/M2 | HEART RATE: 92 BPM | WEIGHT: 249 LBS

## 2020-10-15 DIAGNOSIS — N25.81 SECONDARY HYPERPARATHYROIDISM OF RENAL ORIGIN: ICD-10-CM

## 2020-10-15 PROCEDURE — 99204 OFFICE O/P NEW MOD 45 MIN: CPT

## 2020-10-15 RX ORDER — ASPIRIN 81 MG/1
81 TABLET, CHEWABLE ORAL
Qty: 90 | Refills: 3 | Status: DISCONTINUED | COMMUNITY
Start: 2019-05-16 | End: 2020-10-15

## 2020-10-15 RX ORDER — MULTIVITAMIN
CAPSULE ORAL
Refills: 0 | Status: ACTIVE | COMMUNITY

## 2020-11-10 ENCOUNTER — RESULT REVIEW (OUTPATIENT)
Age: 45
End: 2020-11-10

## 2020-11-10 ENCOUNTER — OUTPATIENT (OUTPATIENT)
Dept: OUTPATIENT SERVICES | Facility: HOSPITAL | Age: 45
LOS: 1 days | Discharge: HOME | End: 2020-11-10
Payer: MEDICARE

## 2020-11-10 ENCOUNTER — APPOINTMENT (OUTPATIENT)
Dept: GASTROENTEROLOGY | Facility: HOSPITAL | Age: 45
End: 2020-11-10

## 2020-11-10 PROCEDURE — 88305 TISSUE EXAM BY PATHOLOGIST: CPT | Mod: 26

## 2020-11-10 PROCEDURE — 88342 IMHCHEM/IMCYTCHM 1ST ANTB: CPT | Mod: 26

## 2020-11-10 PROCEDURE — 43239 EGD BIOPSY SINGLE/MULTIPLE: CPT

## 2020-11-10 PROCEDURE — 88341 IMHCHEM/IMCYTCHM EA ADD ANTB: CPT

## 2020-11-10 PROCEDURE — 88305 TISSUE EXAM BY PATHOLOGIST: CPT

## 2020-11-12 LAB — SURGICAL PATHOLOGY STUDY: SIGNIFICANT CHANGE UP

## 2021-01-11 ENCOUNTER — NON-APPOINTMENT (OUTPATIENT)
Age: 46
End: 2021-01-11

## 2021-01-11 ENCOUNTER — APPOINTMENT (OUTPATIENT)
Dept: HEART AND VASCULAR | Facility: CLINIC | Age: 46
End: 2021-01-11
Payer: MEDICARE

## 2021-01-11 VITALS
DIASTOLIC BLOOD PRESSURE: 75 MMHG | BODY MASS INDEX: 35.76 KG/M2 | HEIGHT: 71 IN | HEART RATE: 68 BPM | SYSTOLIC BLOOD PRESSURE: 110 MMHG | WEIGHT: 255.4 LBS | OXYGEN SATURATION: 97 %

## 2021-01-11 PROCEDURE — 93000 ELECTROCARDIOGRAM COMPLETE: CPT

## 2021-01-11 PROCEDURE — 99215 OFFICE O/P EST HI 40 MIN: CPT | Mod: 25

## 2021-01-11 NOTE — ASSESSMENT
[FreeTextEntry1] : 1. CAD: diagnosed via EBT, Ca+ score 29.4, 81st percentile (08/26/15), s/p mid LAD MI with PCI x 2 (05/25/18), s/p 04/21/20: exercise stress echo: normal EF 59%, resting akinesis apical septum, apical inferior, apical anterior, and apex, post 10 METS. + stable exertional angina and ROWE\par             - will pursue aggressive medical management and lifestyle intervention\par             - continue atorvastatin 80mg po daily for maximal CV risk reduction\par             - continue clopidogrel 75mg po QD\par             - continue off ASA 81mg po QD given multiple duodenal ulcers\par \par 2. Tobacco abuse counseling: \par             - has not smoked since 05/25/18\par             - d/w importance of strict abstinence from tobacco\par  \par 3. Systolic heart failure: EF normalized: \par             - continue Toprol XL 50mg po daily\par             - continue losartan 50mg po daily\par \par 4. Carotid artery disease: mild \par             - will pursue medical management\par \par 5. Duodenal ulcers:\par            - continue off ASA as above\par            - follow up with gastroenterologist, Remigio Cotter MD\par \par 6. Preoperative for right shoulder arthroscopy, loose body removal, subacromial decompression, distal clavicle excision, and debridement: Dino Cordova MD 01/12/21\par           - his CAD is stable and he had an exercise stress echocardiogram on 04/21/20 without inducible ischemia\par           - he may, therefore, proceed with surgery without cardiac contraindications\par           - he may hold clopidogrel for 5 days prior if required\par

## 2021-01-11 NOTE — REVIEW OF SYSTEMS
[Dyspnea on exertion] : dyspnea during exertion [Chest Pain] : chest pain [see HPI] : see HPI [Heartburn] : heartburn [Negative] : Heme/Lymph [Shortness Of Breath] : no shortness of breath [Lower Ext Edema] : no extremity edema [Leg Claudication] : no intermittent leg claudication [Palpitations] : no palpitations [Cough] : no cough [Skin Lesions] : no skin lesions

## 2021-01-11 NOTE — REASON FOR VISIT
[Follow-Up - Clinic] : a clinic follow-up of [FreeTextEntry1] : Diagnostic Tests:\par --------------------------------\par ECG:\par 01/11/21: NSR, normal ECG. \par 06/16/20: sinus rhythm, old anterior MI. \par 08/26/19: NSR, anteroseptal MI. \par 11/14/18: NSR, anteroseptal MI. \par 05/29/18: NSR, anteroseptal MI. \par 02/26/18: NSR, normal ECG.\par 10/14/15: NSR, normal ECG.\par ---------------------------------\par CT:\par 09/19/19: CTA head and neck: minimal carotid plaque b/l, no right carotid dissection. \par 08/26/15: EBT Ca+ score: 29, 81st percentile\par ---------------------------------\par Stress:\par 04/21/20: exercise stress echo: EF 59%, resting akinesis apical septum, apical inferior, apical anterior, and apex, post 10 METS. \par 03/04/19: exercise stress echo: EF 60%, akinesis apical anterior, apical septum, and apex at rest, no inducible ischemia and normal PA pressure post 10 METS. \par 02/19/19: ETT only cardiac rehab: 7.5 METS, normal ECG. \par 11/05/15: exercise stress echo: EF 64%, 10 METS, normal wall motion and PA pressures.\par ---------------------------------\par Cath:\par 05/25/18: at [place]: acute NSTEMI LAD: mid % (PCI x 2), otherwise mild disease\par ---------------------------------\par Echo:\par 02/27/20: EF 68%, akinesis apical septum, apical inferior, apical anterior, and apex. \par 11/30/18: EF 58%, akinesis apical septum and apex, trace MR/TR.\par 06/28/18: EF 48%, grade I diastolic dysfunction, apical septal and apical akinesis, trace MR/TR. \par 05/29/18: EF 35%, large apical MI, mild LVH, trace MR/TR.\par ---------------------------------\par Carotid:\par 09/10/19: sono: b/l prox ICA 1-19%, possible RCCA dissection flap\par

## 2021-01-11 NOTE — HISTORY OF PRESENT ILLNESS
[FreeTextEntry1] : Mr. Mott presents for follow up and management of CAD s/p apical MI with PCI LAD 05/25/18, dyslipidemia, overweight, celiac disease, duodenal ulcers, and tobacco use.   As part of a union contract, he had a "heart scan" via electron beam tomography at [facility] on 08/26/15 which revealed a CA+ score of 29 (81st percentile).  He had an exercise stress echocardiogram on 11/05/15 which revealed an EF of 64%, 10 METS, normal wall motion, and normal PA pressures.  He has an extensive tobacco use history (1 PPD since his teenage years).  On 5/25/18 he began to experience substernal chest pain which he described as " an elephant sitting on his chest" associated with SOB.  He called 911 and was taken to [facility], where he was diagnosed with an acute non-ST elevation myocardial infarction and underwent invasive coronary angiography revealing a 100% thrombotic occlusion of the mid LAD with otherwise mild disease.  He went on to have PCI x 2 to his mid LAD with a good result (Joshua Schmitt -[phone], fax [phone]).  He completed cardiac rehab at [facility].  He had a cough on lisinopril and was switched to losartan 50mg po daily.   In addition, we discussed the results of the TRED-HF trial and agreed to remain on Toprol and losartan despite normalization of his ejection fraction. He had an exercise stress echocardiogram on 04/21/20 which revealed an EF of 59%, resting akinesis apical septum, apical inferior, apical anterior, and apex, post 10 METS.   At present, he has stable ROWE.  He is scheduled for right shoulder arthroscopy, loose body removal, subacromial decompression, distal clavicle excision, and debridement tomorrow at [facility] with Dr. Cordova.  \par \par

## 2021-01-26 ENCOUNTER — NON-APPOINTMENT (OUTPATIENT)
Age: 46
End: 2021-01-26

## 2021-03-03 NOTE — PHYSICAL EXAM
[Alert] : alert [Healthy Appearance] : healthy appearance [No Acute Distress] : no acute distress [Normal Sclera/Conjunctiva] : normal sclera/conjunctiva [No Neck Mass] : no neck mass was observed [No LAD] : no lymphadenopathy [Supple] : the neck was supple [Thyroid Not Enlarged] : the thyroid was not enlarged [Clear to Auscultation] : lungs were clear to auscultation bilaterally [Normal S1, S2] : normal S1 and S2 [No Respiratory Distress] : no respiratory distress [Normal Rate] : heart rate was normal [Regular Rhythm] : with a regular rhythm [Normal Gait] : normal gait [No Stigmata of Cushings Syndrome] : no stigmata of Cushings Syndrome [Normal Insight/Judgement] : insight and judgment were intact [Kyphosis] : no kyphosis present [Acanthosis Nigricans] : no acanthosis nigricans [de-identified] : no moon facies, no supraclavicular fat pads

## 2021-03-03 NOTE — ADDENDUM
[FreeTextEntry1] : Received laboratory results from January 2021 significant for the below; discussed with Mr. Mott. Serum calcium within range. PTH and 25-hydroxyvitamin D within range. 1,25-dihydroxyvitamin D not performed. We can continue his current calcium and vitamin D regimen and plan to repeat blood tests next month. 3/03/21\par \par Laboratories (January 7, 2021) reviewed and significant for: \par Calcium 9.6 mg/dL (albumin 4.6 g/dL)\par PTH 63 pg/mL (normal: 10-65)\par BUN/creatinine 18/.05 mg/dL (eGFR >59 mL/min)\par Alkaline phosphatase 112 U/L\par 25-hydroxyvitamin D 33 ng/mL

## 2021-03-03 NOTE — ASSESSMENT
[FreeTextEntry1] : Hyperparathyroidism and elevated 1,25-dihydroxyvitamin D. He has normal serum calcium. He likely has secondary hyperparathyroidism from vitamin D deficiency and malabsorption from celiac disease. 1,25-dihydroxyvitamin D can be elevated in the setting of elevated PTH concentrations. I recommend he continue his current calcium and vitamin D regimen and recommend additional vitamin D 2000 intl units daily. We will plan to repeat a calciotropic panel in 3 months. Further evaluation and management pending results.

## 2021-03-03 NOTE — HISTORY OF PRESENT ILLNESS
[FreeTextEntry1] : Mr. Mott is a 45 year-old man with a history of multiple medical problems including coronary artery disease, hyperlipidemia, celiac disease, former tobacco use presenting for evaluation of elevated PTH and 1,25-dihydroxyvitamin D. He is referred by Dr. Varghese Cotter.\par \par He was recently diagnosed with celiac disease. \par He was noted to have normal serum calcium (9.2 mg/dL; normal: 8.4-0.5), elevated PTH (81 pg/mL; normal: 15-65), low 25-hydroxyvitamin D (23.1 ng/mL), and elevated 1,25-dihydroxyvitamin D (84.1 pg/mL; normal: 19.9-79.3) in August 2020.\par He has 2 glasses of milk and a serving of cheese daily. \par He takes a One A Day for Men multivitamin gummy with 400 intl units vitamin D.

## 2021-04-27 ENCOUNTER — APPOINTMENT (OUTPATIENT)
Dept: HEART AND VASCULAR | Facility: CLINIC | Age: 46
End: 2021-04-27
Payer: MEDICARE

## 2021-04-27 VITALS
SYSTOLIC BLOOD PRESSURE: 125 MMHG | HEIGHT: 71 IN | OXYGEN SATURATION: 94 % | HEART RATE: 79 BPM | DIASTOLIC BLOOD PRESSURE: 78 MMHG | BODY MASS INDEX: 35.98 KG/M2 | WEIGHT: 257 LBS

## 2021-04-27 PROCEDURE — 99215 OFFICE O/P EST HI 40 MIN: CPT

## 2021-04-27 NOTE — REASON FOR VISIT
[FreeTextEntry1] : Diagnostic Tests:\par --------------------------------\par ECG:\par 01/11/21: NSR, normal ECG. \par 06/16/20: sinus rhythm, old anterior MI. \par 08/26/19: NSR, anteroseptal MI. \par 11/14/18: NSR, anteroseptal MI. \par 05/29/18: NSR, anteroseptal MI. \par 02/26/18: NSR, normal ECG.\par 10/14/15: NSR, normal ECG.\par ---------------------------------\par CT:\par 01/18/21: CT calcium score: Ca+ score 953 (90th percentile), LM 0, , LCx 0, RCA 34, PDA 0, 2 LAD stents.\par 09/19/19: CTA head and neck: minimal carotid plaque b/l, no right carotid dissection. \par 08/26/15: EBT Ca+ score: 29, 81st percentile\par ---------------------------------\par Stress:\par 04/21/20: exercise stress echo: EF 59%, resting akinesis apical septum, apical inferior, apical anterior, and apex, post 10 METS. \par 03/04/19: exercise stress echo: EF 60%, akinesis apical anterior, apical septum, and apex at rest, no inducible ischemia and normal PA pressure post 10 METS. \par 02/19/19: ETT only cardiac rehab: 7.5 METS, normal ECG. \par 11/05/15: exercise stress echo: EF 64%, 10 METS, normal wall motion and PA pressures.\par ---------------------------------\par Cath:\par 05/25/18: at [place]: acute NSTEMI LAD: mid % (PCI x 2), otherwise mild disease\par ---------------------------------\par Echo:\par 02/27/20: EF 68%, akinesis apical septum, apical inferior, apical anterior, and apex. \par 11/30/18: EF 58%, akinesis apical septum and apex, trace MR/TR.\par 06/28/18: EF 48%, grade I diastolic dysfunction, apical septal and apical akinesis, trace MR/TR. \par 05/29/18: EF 35%, large apical MI, mild LVH, trace MR/TR.\par ---------------------------------\par Carotid:\par 09/10/19: sono: b/l prox ICA 1-19%, possible RCCA dissection flap\par

## 2021-04-27 NOTE — ASSESSMENT
[FreeTextEntry1] : 1. CAD: diagnosed via EBT, Ca+ score 29.4, 81st percentile (08/26/15), s/p mid LAD MI with PCI x 2 (05/25/18), s/p 04/21/20: exercise stress echo: normal EF 59%, resting akinesis apical septum, apical inferior, apical anterior, and apex, post 10 METS. + stable exertional angina and ROWE\par             - will pursue aggressive medical management and lifestyle intervention\par             - continue atorvastatin 80mg po daily for maximal CV risk reduction\par             - continue clopidogrel 75mg po QD\par             - continue off ASA 81mg po QD given multiple duodenal ulcers\par             - will send for an exercise stress echocardiogram to rule out inducible ischemia \par \par 2. Tobacco abuse counseling: \par             - has not smoked since 05/25/18\par             - d/w importance of strict abstinence from tobacco\par  \par 3. Systolic heart failure: EF normalized: \par             - continue Toprol XL 50mg po daily\par             - continue losartan 50mg po daily\par \par 4. Carotid artery disease: mild \par            - will pursue medical management\par            - will send for a carotid artery sonogram  \par     \par 5. Duodenal ulcers:\par            - continue off ASA as above\par            - follow up with gastroenterologist, Remigio Cotter MD\par \par 6. r/o EVERT[place]: + snoring, + daytime somnolence\par             - will order a home sleep study\par \par \par

## 2021-04-27 NOTE — REVIEW OF SYSTEMS
[Shortness Of Breath] : no shortness of breath [Lower Ext Edema] : no extremity edema [Leg Claudication] : no intermittent leg claudication [Palpitations] : no palpitations [Cough] : no cough [Skin Lesions] : no skin lesions

## 2021-04-27 NOTE — HISTORY OF PRESENT ILLNESS
[FreeTextEntry1] : Mr. Mott presents for follow up and management of CAD s/p apical MI with PCI LAD 05/25/18, dyslipidemia, overweight, celiac disease, duodenal ulcers, and tobacco use.   As part of a union contract, he had a "heart scan" via electron beam tomography at [facility] on 08/26/15 which revealed a CA+ score of 29 (81st percentile).  He had an exercise stress echocardiogram on 11/05/15 which revealed an EF of 64%, 10 METS, normal wall motion, and normal PA pressures.  He has an extensive tobacco use history (1 PPD since his teenage years).  On 5/25/18 he began to experience substernal chest pain which he described as " an elephant sitting on his chest" associated with SOB.  He called 911 and was taken to [facility], where he was diagnosed with an acute non-ST elevation myocardial infarction and underwent invasive coronary angiography revealing a 100% thrombotic occlusion of the mid LAD with otherwise mild disease.  He went on to have PCI x 2 to his mid LAD with a good result (Joshua Schmitt -[phone], fax [phone]).  He completed cardiac rehab at [facility].  He had a cough on lisinopril and was switched to losartan 50mg po daily.   In addition, we discussed the results of the TRED-HF trial and agreed to remain on Toprol and losartan despite normalization of his ejection fraction. He had an exercise stress echocardiogram on 04/21/20 which revealed an EF of 59%, resting akinesis apical septum, apical inferior, apical anterior, and apex, post 10 METS.   At present, he has stable ROWE and mild chest pain.  \par \par

## 2021-04-28 LAB
ALBUMIN SERPL ELPH-MCNC: 4.4 G/DL
ALP BLD-CCNC: 129 U/L
ALT SERPL-CCNC: 83 U/L
ANION GAP SERPL CALC-SCNC: 14 MMOL/L
AST SERPL-CCNC: 48 U/L
BASOPHILS # BLD AUTO: 0.07 K/UL
BASOPHILS NFR BLD AUTO: 1 %
BILIRUB SERPL-MCNC: 0.5 MG/DL
BUN SERPL-MCNC: 22 MG/DL
CALCIUM SERPL-MCNC: 9.1 MG/DL
CHLORIDE SERPL-SCNC: 104 MMOL/L
CHOLEST SERPL-MCNC: 163 MG/DL
CO2 SERPL-SCNC: 23 MMOL/L
CREAT SERPL-MCNC: 1.08 MG/DL
EOSINOPHIL # BLD AUTO: 0.26 K/UL
EOSINOPHIL NFR BLD AUTO: 3.7 %
ESTIMATED AVERAGE GLUCOSE: 126 MG/DL
GLUCOSE SERPL-MCNC: 123 MG/DL
HBA1C MFR BLD HPLC: 6 %
HCT VFR BLD CALC: 50.5 %
HDLC SERPL-MCNC: 31 MG/DL
HGB BLD-MCNC: 16.1 G/DL
IMM GRANULOCYTES NFR BLD AUTO: 0.7 %
LDLC SERPL CALC-MCNC: 65 MG/DL
LDLC SERPL DIRECT ASSAY-MCNC: 91 MG/DL
LYMPHOCYTES # BLD AUTO: 2.12 K/UL
LYMPHOCYTES NFR BLD AUTO: 30 %
MAN DIFF?: NORMAL
MCHC RBC-ENTMCNC: 30.3 PG
MCHC RBC-ENTMCNC: 31.9 GM/DL
MCV RBC AUTO: 95.1 FL
MONOCYTES # BLD AUTO: 0.64 K/UL
MONOCYTES NFR BLD AUTO: 9.1 %
NEUTROPHILS # BLD AUTO: 3.93 K/UL
NEUTROPHILS NFR BLD AUTO: 55.5 %
NONHDLC SERPL-MCNC: 132 MG/DL
PLATELET # BLD AUTO: 281 K/UL
POTASSIUM SERPL-SCNC: 4.6 MMOL/L
PROT SERPL-MCNC: 7.4 G/DL
RBC # BLD: 5.31 M/UL
RBC # FLD: 12.6 %
SODIUM SERPL-SCNC: 140 MMOL/L
TRIGL SERPL-MCNC: 336 MG/DL
TSH SERPL-ACNC: 1.44 UIU/ML
WBC # FLD AUTO: 7.07 K/UL

## 2021-05-05 ENCOUNTER — RX RENEWAL (OUTPATIENT)
Age: 46
End: 2021-05-05

## 2021-06-01 ENCOUNTER — RX RENEWAL (OUTPATIENT)
Age: 46
End: 2021-06-01

## 2021-06-04 ENCOUNTER — APPOINTMENT (OUTPATIENT)
Dept: OTOLARYNGOLOGY | Facility: CLINIC | Age: 46
End: 2021-06-04

## 2021-06-08 ENCOUNTER — APPOINTMENT (OUTPATIENT)
Dept: HEART AND VASCULAR | Facility: CLINIC | Age: 46
End: 2021-06-08
Payer: MEDICARE

## 2021-06-08 VITALS
BODY MASS INDEX: 35.42 KG/M2 | SYSTOLIC BLOOD PRESSURE: 126 MMHG | OXYGEN SATURATION: 96 % | TEMPERATURE: 98.1 F | WEIGHT: 253 LBS | DIASTOLIC BLOOD PRESSURE: 85 MMHG | HEIGHT: 71 IN | HEART RATE: 97 BPM

## 2021-06-08 PROCEDURE — 93880 EXTRACRANIAL BILAT STUDY: CPT | Mod: 59

## 2021-06-08 PROCEDURE — 99214 OFFICE O/P EST MOD 30 MIN: CPT | Mod: 25

## 2021-06-08 PROCEDURE — 93351 STRESS TTE COMPLETE: CPT | Mod: 59

## 2021-06-08 NOTE — PHYSICAL EXAM
[General Appearance - Well Developed] : well developed [Normal Appearance] : normal appearance [Well Groomed] : well groomed [General Appearance - Well Nourished] : well nourished [No Deformities] : no deformities [General Appearance - In No Acute Distress] : no acute distress [Normal Conjunctiva] : the conjunctiva exhibited no abnormalities [Eyelids - No Xanthelasma] : the eyelids demonstrated no xanthelasmas [Normal Oral Mucosa] : normal oral mucosa [No Oral Pallor] : no oral pallor [No Oral Cyanosis] : no oral cyanosis [Normal Jugular Venous A Waves Present] : normal jugular venous A waves present [Normal Jugular Venous V Waves Present] : normal jugular venous V waves present [No Jugular Venous Miller A Waves] : no jugular venous miller A waves [Respiration, Rhythm And Depth] : normal respiratory rhythm and effort [Exaggerated Use Of Accessory Muscles For Inspiration] : no accessory muscle use [Auscultation Breath Sounds / Voice Sounds] : lungs were clear to auscultation bilaterally [Heart Rate And Rhythm] : heart rate and rhythm were normal [Heart Sounds] : normal S1 and S2 [Murmurs] : no murmurs present [Abdomen Tenderness] : non-tender [Abdomen Soft] : soft [Abdomen Mass (___ Cm)] : no abdominal mass palpated [Abnormal Walk] : normal gait [Gait - Sufficient For Exercise Testing] : the gait was sufficient for exercise testing [Cyanosis, Localized] : no localized cyanosis [Nail Clubbing] : no clubbing of the fingernails [Petechial Hemorrhages (___cm)] : no petechial hemorrhages [Skin Color & Pigmentation] : normal skin color and pigmentation [] : no rash [No Venous Stasis] : no venous stasis [Skin Lesions] : no skin lesions [No Skin Ulcers] : no skin ulcer [No Xanthoma] : no  xanthoma was observed [Oriented To Time, Place, And Person] : oriented to person, place, and time [Affect] : the affect was normal [Mood] : the mood was normal [No Anxiety] : not feeling anxious [FreeTextEntry1] : + ecchymosis

## 2021-06-08 NOTE — ASSESSMENT
[FreeTextEntry1] : 1. CAD: diagnosed via EBT, Ca+ score 29.4, 81st percentile (08/26/15), s/p mid LAD MI with PCI x 2 (05/25/18), s/p 06/08/21: exercise stress echo: normal EF 64%, resting akinesis apical septum, apical inferior, apical anterior, and apex, and no inducible ischemia post 10 METS. + stable exertional angina and ROWE\par             - will pursue aggressive medical management and lifestyle intervention\par             - continue atorvastatin 80mg po daily for maximal CV risk reduction\par             - continue clopidogrel 75mg po QD\par \par 2. Tobacco abuse counseling: \par             - has not smoked since 05/25/18\par             - d/w importance of strict abstinence from tobacco\par  \par 3. Systolic heart failure: EF normalized: \par             - continue Toprol XL 50mg po daily\par             - continue losartan 50mg po daily\par \par 4. Carotid artery disease: mild \par            - will pursue medical management\par            - will follow with periodic carotid artery sonograms \par     \par 5. Duodenal ulcers: healed on EGD 11/10/20\par            - continue off ASA as above\par            - follow up with gastroenterologist, Remigio Cotter MD\par \par 6. r/o EVERTON: + snoring, + daytime somnolence\par             - will re-order home sleep study\par \par \par

## 2021-06-08 NOTE — HISTORY OF PRESENT ILLNESS
[FreeTextEntry1] : Mr. Mott presents for follow up and management of CAD s/p apical MI with PCI LAD 05/25/18, dyslipidemia, overweight, celiac disease, duodenal ulcers, and tobacco use.   As part of a union contract, he had a "heart scan" via electron beam tomography at [facility] on 08/26/15 which revealed a CA+ score of 29 (81st percentile).   He has an extensive tobacco use history (1 PPD since his teenage years).  On 5/25/18 he began to experience substernal chest pain which he described as " an elephant sitting on his chest" associated with SOB.  He called 911 and was taken to [facility], where he was diagnosed with an acute non-ST elevation myocardial infarction and underwent invasive coronary angiography revealing a 100% thrombotic occlusion of the mid LAD with otherwise mild disease.  He went on to have PCI x 2 to his mid LAD with a good result (Joshua Schmitt -[phone], fax [phone]).   In addition, we discussed the results of the TRED-HF trial and agreed to remain on Toprol and losartan despite normalization of his ejection fraction. Today, 06/08/21, he had an exercise stress echocardiogram which revealed an EF of 63%, resting akinesis apical septum, apical inferior, apical anterior, and apex, with no inducible ischemia post 10 METS.   At present, he has stable ROWE and mild chest pain.  \par \par

## 2021-06-23 ENCOUNTER — OUTPATIENT (OUTPATIENT)
Dept: OUTPATIENT SERVICES | Facility: HOSPITAL | Age: 46
LOS: 1 days | End: 2021-06-23
Payer: MEDICARE

## 2021-06-23 ENCOUNTER — APPOINTMENT (OUTPATIENT)
Dept: SLEEP CENTER | Facility: HOME HEALTH | Age: 46
End: 2021-06-23
Payer: MEDICARE

## 2021-06-23 PROCEDURE — G0400: CPT | Mod: 26

## 2021-06-23 PROCEDURE — 95800 SLP STDY UNATTENDED: CPT

## 2021-06-24 ENCOUNTER — APPOINTMENT (OUTPATIENT)
Dept: OTOLARYNGOLOGY | Facility: CLINIC | Age: 46
End: 2021-06-24
Payer: MEDICARE

## 2021-06-24 VITALS — BODY MASS INDEX: 34.3 KG/M2 | TEMPERATURE: 98.3 F | WEIGHT: 245 LBS | HEIGHT: 71 IN

## 2021-06-24 PROCEDURE — 99203 OFFICE O/P NEW LOW 30 MIN: CPT

## 2021-06-24 PROCEDURE — 92557 COMPREHENSIVE HEARING TEST: CPT

## 2021-06-24 PROCEDURE — 92550 TYMPANOMETRY & REFLEX THRESH: CPT

## 2021-06-24 RX ORDER — OMEPRAZOLE 40 MG/1
40 CAPSULE, DELAYED RELEASE ORAL
Qty: 30 | Refills: 5 | Status: DISCONTINUED | COMMUNITY
Start: 2020-07-28 | End: 2021-06-24

## 2021-06-24 NOTE — CONSULT LETTER
[Dear  ___] : Dear  [unfilled], [Consult Letter:] : I had the pleasure of evaluating your patient, [unfilled]. [Please see my note below.] : Please see my note below. [Consult Closing:] : Thank you very much for allowing me to participate in the care of this patient.  If you have any questions, please do not hesitate to contact me. [Sincerely,] : Sincerely, [FreeTextEntry3] : Carole Davis MD\par  [DrSyed  ___] : Dr. AJ
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
## 2021-06-25 DIAGNOSIS — G47.33 OBSTRUCTIVE SLEEP APNEA (ADULT) (PEDIATRIC): ICD-10-CM

## 2021-06-29 ENCOUNTER — NON-APPOINTMENT (OUTPATIENT)
Age: 46
End: 2021-06-29

## 2021-06-29 DIAGNOSIS — R79.89 OTHER SPECIFIED ABNORMAL FINDINGS OF BLOOD CHEMISTRY: ICD-10-CM

## 2021-06-29 DIAGNOSIS — H93.299 OTHER ABNORMAL AUDITORY PERCEPTIONS, UNSPECIFIED EAR: ICD-10-CM

## 2021-06-29 DIAGNOSIS — H93.13 TINNITUS, BILATERAL: ICD-10-CM

## 2021-06-29 DIAGNOSIS — R89.4 ABNORMAL IMMUNOLOGICAL FINDINGS IN SPECIMENS FROM OTHER ORGANS, SYSTEMS AND TISSUES: ICD-10-CM

## 2021-06-29 DIAGNOSIS — Z87.898 PERSONAL HISTORY OF OTHER SPECIFIED CONDITIONS: ICD-10-CM

## 2021-06-29 DIAGNOSIS — I10 ESSENTIAL (PRIMARY) HYPERTENSION: ICD-10-CM

## 2021-06-29 DIAGNOSIS — H90.5 UNSPECIFIED SENSORINEURAL HEARING LOSS: ICD-10-CM

## 2021-06-29 DIAGNOSIS — R42 DIZZINESS AND GIDDINESS: ICD-10-CM

## 2021-08-16 ENCOUNTER — APPOINTMENT (OUTPATIENT)
Dept: HEART AND VASCULAR | Facility: CLINIC | Age: 46
End: 2021-08-16
Payer: MEDICARE

## 2021-08-16 ENCOUNTER — APPOINTMENT (OUTPATIENT)
Dept: GASTROENTEROLOGY | Facility: CLINIC | Age: 46
End: 2021-08-16
Payer: MEDICARE

## 2021-08-16 ENCOUNTER — NON-APPOINTMENT (OUTPATIENT)
Age: 46
End: 2021-08-16

## 2021-08-16 VITALS
TEMPERATURE: 98.5 F | OXYGEN SATURATION: 96 % | SYSTOLIC BLOOD PRESSURE: 107 MMHG | DIASTOLIC BLOOD PRESSURE: 68 MMHG | WEIGHT: 236 LBS | BODY MASS INDEX: 33.04 KG/M2 | HEART RATE: 72 BPM | HEIGHT: 71 IN

## 2021-08-16 VITALS
SYSTOLIC BLOOD PRESSURE: 116 MMHG | OXYGEN SATURATION: 97 % | DIASTOLIC BLOOD PRESSURE: 74 MMHG | HEART RATE: 82 BPM | HEIGHT: 71 IN | BODY MASS INDEX: 33.18 KG/M2 | WEIGHT: 237 LBS

## 2021-08-16 PROBLEM — H93.13 TINNITUS, BILATERAL: Status: RESOLVED | Noted: 2021-06-24 | Resolved: 2021-08-16

## 2021-08-16 PROBLEM — R79.89 ELEVATED LFTS: Status: RESOLVED | Noted: 2020-07-01 | Resolved: 2021-08-16

## 2021-08-16 PROBLEM — R42 DIZZINESS AND GIDDINESS: Status: RESOLVED | Noted: 2021-06-24 | Resolved: 2021-08-16

## 2021-08-16 PROBLEM — I10 BENIGN ESSENTIAL HTN: Noted: 2020-08-19

## 2021-08-16 PROBLEM — H90.5 ASYMMETRICAL SENSORINEURAL HEARING LOSS: Status: RESOLVED | Noted: 2021-06-24 | Resolved: 2021-08-16

## 2021-08-16 PROBLEM — Z87.898 HISTORY OF DIARRHEA: Status: RESOLVED | Noted: 2020-07-01 | Resolved: 2021-08-16

## 2021-08-16 PROBLEM — H93.299 ABNORMAL AUDITORY PERCEPTION: Status: RESOLVED | Noted: 2021-06-24 | Resolved: 2021-08-16

## 2021-08-16 PROBLEM — R89.4 ABNORMAL CELIAC ANTIBODY PANEL: Status: RESOLVED | Noted: 2020-07-18 | Resolved: 2021-08-16

## 2021-08-16 PROCEDURE — 93000 ELECTROCARDIOGRAM COMPLETE: CPT

## 2021-08-16 PROCEDURE — 99215 OFFICE O/P EST HI 40 MIN: CPT | Mod: 25

## 2021-08-16 PROCEDURE — 99214 OFFICE O/P EST MOD 30 MIN: CPT

## 2021-08-16 NOTE — HISTORY OF PRESENT ILLNESS
[FreeTextEntry1] : Mr. Mott presents for follow up and management of CAD s/p apical MI with PCI LAD 05/25/18, dyslipidemia, overweight, celiac disease, duodenal ulcers, and tobacco use.   As part of a union contract, he had a "heart scan" via electron beam tomography at [facility] on 08/26/15 which revealed a CA+ score of 29 (81st percentile).   He has an extensive tobacco use history (1 PPD since his teenage years).  On 5/25/18 he began to experience substernal chest pain which he described as " an elephant sitting on his chest" associated with SOB.  He called 911 and was taken to [facility], where he was diagnosed with an acute non-ST elevation myocardial infarction and underwent invasive coronary angiography revealing a 100% thrombotic occlusion of the mid LAD with otherwise mild disease.  He went on to have PCI x 2 to his mid LAD with a good result (Joshua Schmitt -[phone], fax [phone]).  He completed cardiac rehab at [facility]. We discussed the results of the TRED-HF trial and agreed to remain on Toprol and losartan despite normalization of his ejection fraction. He had an exercise stress echocardiogram on 06/08/21 which revealed an EF of 63%, resting akinesis apical septum, apical inferior, apical anterior, and apex, post 10.1 METS.   He is seen today as an urgent add-on secondary to an episode of chest pressure over the weekend which was reminiscent of his prior anginal episodes.  He has not had recurrence since the weekend and feels well at present.  \par \par

## 2021-08-16 NOTE — ASSESSMENT
[FreeTextEntry1] : 1. CAD: s/p apical MI s/p PCI LAD (05/28/18): + CP\par             - will pursue aggressive medical management and lifestyle intervention\par             - continue atorvastatin 80mg po daily for maximal CV risk reduction\par             - continue clopidogrel 75mg po QD\par             - continue off ASA 81mg po QD given multiple duodenal ulcers\par             - will send for a CT coronary angiogram to rule out progression of CAD\par              - discussed with patient importance of seeking immediate medical attention if anginal type chest pain develops \par \par 2. Tobacco abuse counseling: \par             - has not smoked since 05/25/18\par             - d/w importance of strict abstinence from tobacco\par  \par 3. Systolic heart failure: EF normalized: \par             - continue Toprol XL 50mg po daily\par             - continue losartan 50mg po daily\par \par 4. Carotid artery disease: mild \par            - will pursue medical management\par            - will send for a carotid artery sonogram  \par     \par 5. Duodenal ulcers:\par            - continue off ASA as above\par            - follow up with gastroenterologist, Remigio Cotter MD\par \par 6. EVERTON: mild\par             - continue therapeutic lifestyle changes\par \par An ECG was obtained to evaluate heart rhythm and screen for any underlying cardiac abnormalities. \par \par The patient was seen and examined with a cardiology fellow as part of their longitudinal ambulatory cardiology training experience.  Permission was obtained at the time of the visit from the patient for the fellow's participation. \par \par

## 2021-08-16 NOTE — REASON FOR VISIT
[FreeTextEntry1] : Diagnostic Tests:\par --------------------------------\par ECG:\par 08/16/21: sinus rhythm, normal ECG. \par 01/11/21: NSR, normal ECG. \par 06/16/20: sinus rhythm, old anterior MI. \par 08/26/19: NSR, anteroseptal MI. \par 11/14/18: NSR, anteroseptal MI. \par 05/29/18: NSR, anteroseptal MI. \par 02/26/18: NSR, normal ECG.\par 10/14/15: NSR, normal ECG.\par ---------------------------------\par CT:\par 01/18/21: CT calcium score: Ca+ score 953 (90th percentile), LM 0, , LCx 0, RCA 34, PDA 0, 2 LAD stents.\par 09/19/19: CTA head and neck: minimal carotid plaque b/l, no right carotid dissection. \par 08/26/15: EBT Ca+ score: 29, 81st percentile\par ---------------------------------\par Stress:\par 06/08/21: exercise stress echo: EF 63%, normal wall motion, PA pressures, and ECG post 10.1 METS. \par 04/21/20: exercise stress echo: EF 59%, resting akinesis apical septum, apical inferior, apical anterior, and apex, post 10 METS. \par 03/04/19: exercise stress echo: EF 60%, akinesis apical anterior, apical septum, and apex at rest, no inducible ischemia and normal PA pressure post 10 METS. \par 02/19/19: ETT only cardiac rehab: 7.5 METS, normal ECG. \par 11/05/15: exercise stress echo: EF 64%, 10 METS, normal wall motion and PA pressures.\par ---------------------------------\par Cath:\par 05/25/18: at [place]: acute NSTEMI LAD: mid % (PCI x 2), otherwise mild disease\par ---------------------------------\par Echo:\par 02/27/20: EF 68%, akinesis apical septum, apical inferior, apical anterior, and apex. \par 11/30/18: EF 58%, akinesis apical septum and apex, trace MR/TR.\par 06/28/18: EF 48%, grade I diastolic dysfunction, apical septal and apical akinesis, trace MR/TR. \par 05/29/18: EF 35%, large apical MI, mild LVH, trace MR/TR.\par ---------------------------------\par Carotid:\par 09/10/19: sono: b/l prox ICA 1-19%, possible RCCA dissection flap.\par ---------------------------------\par Sleep studies:\par 06/23/21: katelins EVERTON. \par

## 2021-08-30 ENCOUNTER — RX RENEWAL (OUTPATIENT)
Age: 46
End: 2021-08-30

## 2021-09-02 ENCOUNTER — APPOINTMENT (OUTPATIENT)
Dept: CT IMAGING | Facility: CLINIC | Age: 46
End: 2021-09-02
Payer: MEDICARE

## 2021-09-02 ENCOUNTER — RESULT REVIEW (OUTPATIENT)
Age: 46
End: 2021-09-02

## 2021-09-02 ENCOUNTER — NON-APPOINTMENT (OUTPATIENT)
Age: 46
End: 2021-09-02

## 2021-09-02 ENCOUNTER — OUTPATIENT (OUTPATIENT)
Dept: OUTPATIENT SERVICES | Facility: HOSPITAL | Age: 46
LOS: 1 days | End: 2021-09-02

## 2021-09-02 PROCEDURE — 75574 CT ANGIO HRT W/3D IMAGE: CPT | Mod: 26,MH

## 2021-09-07 ENCOUNTER — APPOINTMENT (OUTPATIENT)
Dept: HEART AND VASCULAR | Facility: CLINIC | Age: 46
End: 2021-09-07

## 2021-11-15 ENCOUNTER — APPOINTMENT (OUTPATIENT)
Dept: HEART AND VASCULAR | Facility: CLINIC | Age: 46
End: 2021-11-15
Payer: MEDICARE

## 2021-11-15 VITALS
DIASTOLIC BLOOD PRESSURE: 79 MMHG | WEIGHT: 240 LBS | SYSTOLIC BLOOD PRESSURE: 121 MMHG | BODY MASS INDEX: 33.6 KG/M2 | TEMPERATURE: 96.9 F | HEIGHT: 71 IN | OXYGEN SATURATION: 97 % | HEART RATE: 105 BPM

## 2021-11-15 PROCEDURE — 99214 OFFICE O/P EST MOD 30 MIN: CPT

## 2021-11-15 NOTE — HISTORY OF PRESENT ILLNESS
[FreeTextEntry1] : Mr. Mott presents for follow up and management of CAD s/p apical MI with PCI LAD 05/25/18, dyslipidemia, overweight, celiac disease, duodenal ulcers, and tobacco use.   As part of a union contract, he had a "heart scan" via electron beam tomography at [facility] on 08/26/15 which revealed a CA+ score of 29 (81st percentile).   He has an extensive tobacco use history (1 PPD since his teenage years).  On 5/25/18 he began to experience substernal chest pain which he described as " an elephant sitting on his chest" associated with SOB.  He called 911 and was taken to [facility], where he was diagnosed with an acute non-ST elevation myocardial infarction and underwent invasive coronary angiography revealing a 100% thrombotic occlusion of the mid LAD with otherwise mild disease.  He went on to have PCI x 2 to his mid LAD with a good result (Joshua Schmitt -[phone], fax [phone]).  He completed cardiac rehab at [facility]. We discussed the results of the TRED-HF trial and agreed to remain on Toprol and losartan despite normalization of his ejection fraction. He had an exercise stress echocardiogram on 06/08/21 which revealed an EF of 63%, resting akinesis apical septum, apical inferior, apical anterior, and apex, post 10.1 METS.  On 09/02/21 he had a CTCA which revealed LM mild, LAD prox mild-to-moderate, mid patent stent, distal moderate, D1/D2 normal, LCX/OM1/OM2 normal, RI mild, RCA mild. RPL/RPDA normal, and an EF of 61%. At present, he is doing well. \par \par

## 2021-11-15 NOTE — ASSESSMENT
[FreeTextEntry1] : 1. CAD: s/p apical MI s/p PCI LAD (05/28/18): s/p CTCA 09/02/21: LM mild, LAD prox mild-to-moderate, mid patent stent, distal moderate, D1/D2 normal, LCX/OM1/OM2 normal, RI mild, RCA mild. RPL/RPDA normal, EF 61%.\par             - will pursue aggressive medical management and lifestyle intervention\par             - continue atorvastatin 80mg po daily \par             - continue clopidogrel 75mg po qd\par             - continue off ASA 81mg po qd given multiple duodenal ulcers\par             - discussed with patient importance of seeking immediate medical attention if anginal type chest pain develops \par \par 2. Prior tobacco use: \par             - has not smoked since 05/25/18\par             - d/w importance of continued strict abstinence from tobacco\par  \par 3. Systolic heart failure: EF normalized: \par             - continue Toprol XL 50mg po daily\par             - continue losartan 50mg po daily\par \par 4. Carotid artery disease: mild \par            - will pursue medical management\par     \par 5. Duodenal ulcers:\par            - continue off ASA as above\par            - follow up with gastroenterologist\par \par 6. EVERTON: mild\par             - continue therapeutic lifestyle changes\par

## 2021-11-15 NOTE — REASON FOR VISIT
[FreeTextEntry1] : Diagnostic Tests:\par --------------------------------\par ECG:\par 08/16/21: sinus rhythm, normal ECG. \par 01/11/21: NSR, normal ECG. \par 06/16/20: sinus rhythm, old anterior MI. \par 08/26/19: NSR, anteroseptal MI. \par 11/14/18: NSR, anteroseptal MI. \par 05/29/18: NSR, anteroseptal MI. \par 02/26/18: NSR, normal ECG.\par 10/14/15: NSR, normal ECG.\par ---------------------------------\par CT:\par 09/02/21: LM mild, LAD prox mild-to-moderate, mid patent stent, distal moderate, D1/D2 normal, LCX/OM1/OM2 normal, RI mild, RCA mild. RPL/RPDA normal, EF 61%.\par 01/18/21: CT calcium score: Ca+ score 953 (90th percentile), LM 0, , LCx 0, RCA 34, PDA 0, 2 LAD stents.\par 09/19/19: CTA head and neck: minimal carotid plaque b/l, no right carotid dissection. \par 08/26/15: EBT Ca+ score: 29, 81st percentile\par ---------------------------------\par Stress:\par 06/08/21: exercise stress echo: EF 63%, normal wall motion, PA pressures, and ECG post 10.1 METS. \par 04/21/20: exercise stress echo: EF 59%, resting akinesis apical septum, apical inferior, apical anterior, and apex, post 10 METS. \par 03/04/19: exercise stress echo: EF 60%, akinesis apical anterior, apical septum, and apex at rest, no inducible ischemia and normal PA pressure post 10 METS. \par 02/19/19: ETT only cardiac rehab: 7.5 METS, normal ECG. \par 11/05/15: exercise stress echo: EF 64%, 10 METS, normal wall motion and PA pressures.\par ---------------------------------\par Cath:\par 05/25/18: at [place]: acute NSTEMI LAD: mid % (PCI x 2), otherwise mild disease\par ---------------------------------\par Echo:\par 02/27/20: EF 68%, akinesis apical septum, apical inferior, apical anterior, and apex. \par 11/30/18: EF 58%, akinesis apical septum and apex, trace MR/TR.\par 06/28/18: EF 48%, grade I diastolic dysfunction, apical septal and apical akinesis, trace MR/TR. \par 05/29/18: EF 35%, large apical MI, mild LVH, trace MR/TR.\par ---------------------------------\par Carotid:\par 09/10/19: sono: b/l prox ICA 1-19%, possible RCCA dissection flap.\par ---------------------------------\par Sleep studies:\par 06/23/21: mils EVERTON. \par

## 2021-11-24 ENCOUNTER — RX RENEWAL (OUTPATIENT)
Age: 46
End: 2021-11-24

## 2022-02-14 ENCOUNTER — APPOINTMENT (OUTPATIENT)
Dept: HEART AND VASCULAR | Facility: CLINIC | Age: 47
End: 2022-02-14
Payer: MEDICARE

## 2022-02-14 ENCOUNTER — NON-APPOINTMENT (OUTPATIENT)
Age: 47
End: 2022-02-14

## 2022-02-14 VITALS
TEMPERATURE: 96.8 F | DIASTOLIC BLOOD PRESSURE: 62 MMHG | HEIGHT: 71 IN | HEART RATE: 89 BPM | OXYGEN SATURATION: 96 % | WEIGHT: 242 LBS | BODY MASS INDEX: 33.88 KG/M2 | SYSTOLIC BLOOD PRESSURE: 95 MMHG

## 2022-02-14 PROCEDURE — 93000 ELECTROCARDIOGRAM COMPLETE: CPT

## 2022-02-14 PROCEDURE — 99214 OFFICE O/P EST MOD 30 MIN: CPT | Mod: 25

## 2022-02-14 NOTE — ASSESSMENT
[FreeTextEntry1] : 1. CAD: s/p apical MI s/p PCI LAD (05/28/18): s/p CTCA 09/02/21: LM mild, LAD prox mild-to-moderate, mid patent stent, distal moderate, D1/D2 normal, LCX/OM1/OM2 normal, RI mild, RCA mild. RPL/RPDA normal, EF 61%.\par             - will pursue aggressive medical management and lifestyle intervention\par             - continue atorvastatin 80mg po daily \par             - continue clopidogrel 75mg po qd\par             - continue off ASA 81mg po qd given multiple duodenal ulcers\par             - discussed with patient importance of seeking immediate medical attention if anginal type chest pain develops \par             - will send for an echocardiogram \par \par 2. Prior tobacco use: \par             - has not smoked since 05/25/18\par             - d/w importance of continued strict abstinence from tobacco\par  \par 3. Systolic heart failure: EF normalized: \par             - continue Toprol XL 50mg po daily\par             - continue losartan 50mg po daily\par \par 4. Carotid artery disease: mild \par            - will pursue medical management\par     \par 5. Duodenal ulcers:\par            - continue off ASA as above\par            - follow up with gastroenterologist\par \par 6. EVERTON: mild\par             - continue therapeutic lifestyle changes\par \par An ECG was obtained to evaluate heart rhythm and screen for any underlying cardiac abnormalities. \par

## 2022-02-14 NOTE — HISTORY OF PRESENT ILLNESS
[FreeTextEntry1] : Mr. Mott presents for follow up and management of CAD s/p apical MI with PCI LAD 05/25/18, dyslipidemia, overweight, celiac disease, duodenal ulcers, and tobacco use.   As part of a union contract, he had a "heart scan" via electron beam tomography at [facility] on 08/26/15 which revealed a CA+ score of 29 (81st percentile).   He has an extensive tobacco use history (1 PPD since his teenage years).  On 5/25/18 he began to experience substernal chest pain which he described as " an elephant sitting on his chest" associated with SOB.  He called 911 and was taken to [facility], where he was diagnosed with an acute non-ST elevation myocardial infarction and underwent invasive coronary angiography revealing a 100% thrombotic occlusion of the mid LAD with otherwise mild disease.  He went on to have PCI x 2 to his mid LAD with a good result (Joshua Schmitt -[phone], fax [phone]).  He completed cardiac rehab at [facility]. We discussed the results of the TRED-HF trial and agreed to remain on Toprol and losartan despite normalization of his ejection fraction. He had an exercise stress echocardiogram on 06/08/21 which revealed an EF of 63%, resting akinesis apical septum, apical inferior, apical anterior, and apex, post 10.1 METS.  On 09/02/21 he had a CTCA which revealed LM mild, LAD prox mild-to-moderate, mid patent stent, distal moderate, D1/D2 normal, LCX/OM1/OM2 normal, RI mild, RCA mild. RPL/RPDA normal, and an EF of 61%. He had COVID-19 in December, 2021 (likely omicron variant) and had minor URI symptoms.  He has complaints of ROWE to 1-2 flights of stairs.  He is considering getting a new tattoo in the near future.  \par

## 2022-02-14 NOTE — PHYSICAL EXAM

## 2022-02-14 NOTE — REASON FOR VISIT
[Other: ____] : [unfilled] [FreeTextEntry1] : Diagnostic Tests:\par --------------------------------\par ECG:\par 02/14/22: sinus rhythm, possible old septal MI. \par 08/16/21: sinus rhythm, normal ECG. \par 01/11/21: NSR, normal ECG. \par 06/16/20: sinus rhythm, old anterior MI. \par 08/26/19: NSR, anteroseptal MI. \par 11/14/18: NSR, anteroseptal MI. \par 05/29/18: NSR, anteroseptal MI. \par 02/26/18: NSR, normal ECG.\par 10/14/15: NSR, normal ECG.\par ---------------------------------\par CT:\par 09/02/21: LM mild, LAD prox mild-to-moderate, mid patent stent, distal moderate, D1/D2 normal, LCX/OM1/OM2 normal, RI mild, RCA mild. RPL/RPDA normal, EF 61%.\par 01/18/21: CT calcium score: Ca+ score 953 (90th percentile), LM 0, , LCx 0, RCA 34, PDA 0, 2 LAD stents.\par 09/19/19: CTA head and neck: minimal carotid plaque b/l, no right carotid dissection. \par 08/26/15: EBT Ca+ score: 29, 81st percentile\par ---------------------------------\par Stress:\par 06/08/21: exercise stress echo: EF 63%, normal wall motion, PA pressures, and ECG post 10.1 METS. \par 04/21/20: exercise stress echo: EF 59%, resting akinesis apical septum, apical inferior, apical anterior, and apex, post 10 METS. \par 03/04/19: exercise stress echo: EF 60%, akinesis apical anterior, apical septum, and apex at rest, no inducible ischemia and normal PA pressure post 10 METS. \par 02/19/19: ETT only cardiac rehab: 7.5 METS, normal ECG. \par 11/05/15: exercise stress echo: EF 64%, 10 METS, normal wall motion and PA pressures.\par ---------------------------------\par Cath:\par 05/25/18: at [place]: acute NSTEMI LAD: mid % (PCI x 2), otherwise mild disease\par ---------------------------------\par Echo:\par 02/27/20: EF 68%, akinesis apical septum, apical inferior, apical anterior, and apex. \par 11/30/18: EF 58%, akinesis apical septum and apex, trace MR/TR.\par 06/28/18: EF 48%, grade I diastolic dysfunction, apical septal and apical akinesis, trace MR/TR. \par 05/29/18: EF 35%, large apical MI, mild LVH, trace MR/TR.\par ---------------------------------\par Carotid:\par 09/10/19: sono: b/l prox ICA 1-19%, possible RCCA dissection flap.\par ---------------------------------\par Sleep studies:\par 06/23/21: mils EVERTON. \par

## 2022-04-11 ENCOUNTER — NON-APPOINTMENT (OUTPATIENT)
Age: 47
End: 2022-04-11

## 2022-04-11 ENCOUNTER — APPOINTMENT (OUTPATIENT)
Dept: HEART AND VASCULAR | Facility: CLINIC | Age: 47
End: 2022-04-11
Payer: MEDICARE

## 2022-04-11 PROCEDURE — 93306 TTE W/DOPPLER COMPLETE: CPT

## 2022-05-23 ENCOUNTER — APPOINTMENT (OUTPATIENT)
Dept: HEART AND VASCULAR | Facility: CLINIC | Age: 47
End: 2022-05-23

## 2022-06-17 ENCOUNTER — RX RENEWAL (OUTPATIENT)
Age: 47
End: 2022-06-17

## 2022-06-28 ENCOUNTER — APPOINTMENT (OUTPATIENT)
Dept: HEART AND VASCULAR | Facility: CLINIC | Age: 47
End: 2022-06-28

## 2022-08-17 ENCOUNTER — RX RENEWAL (OUTPATIENT)
Age: 47
End: 2022-08-17

## 2022-08-30 ENCOUNTER — APPOINTMENT (OUTPATIENT)
Dept: HEART AND VASCULAR | Facility: CLINIC | Age: 47
End: 2022-08-30

## 2022-08-30 VITALS
HEART RATE: 85 BPM | BODY MASS INDEX: 32.24 KG/M2 | OXYGEN SATURATION: 98 % | HEIGHT: 71 IN | WEIGHT: 230.31 LBS | DIASTOLIC BLOOD PRESSURE: 67 MMHG | TEMPERATURE: 96.8 F | SYSTOLIC BLOOD PRESSURE: 103 MMHG

## 2022-08-30 PROCEDURE — 99214 OFFICE O/P EST MOD 30 MIN: CPT | Mod: 25

## 2022-08-30 PROCEDURE — 93351 STRESS TTE COMPLETE: CPT | Mod: 59

## 2022-08-30 NOTE — ASSESSMENT
[FreeTextEntry1] : 1. CAD: s/p apical MI s/p PCI LAD (05/28/18): s/p CTCA 09/02/21: LM mild, LAD prox mild-to-moderate, mid patent stent, distal moderate, D1/D2 normal, LCX/OM1/OM2 normal, RI mild, RCA mild. RPL/RPDA normal, EF 61%.\par             - will pursue aggressive medical management and lifestyle intervention\par             - continue atorvastatin 80mg po daily \par             - continue clopidogrel 75mg po qd\par             - continue off ASA 81mg po qd given multiple duodenal ulcers\par             - discussed with patient importance of seeking immediate medical attention if anginal type chest pain develops \par \par 2. Prior tobacco use: \par             - has not smoked since 05/25/18\par             - d/w importance of continued strict abstinence from tobacco\par  \par 3. Systolic heart failure: EF normalized: \par             - continue Toprol XL 50mg po daily\par             - continue losartan 50mg po daily\par \par 4. Carotid artery disease: mild \par            - will pursue medical management\par     \par 5. Duodenal ulcers:\par            - continue off ASA as above\par            - follow up with gastroenterologist\par \par 6. EVERTON: mild\par             - continue therapeutic lifestyle changes\par \par \par

## 2022-08-30 NOTE — HISTORY OF PRESENT ILLNESS
[FreeTextEntry1] : Mr. Mott presents for follow up and management of CAD s/p apical MI with PCI LAD 05/25/18, dyslipidemia, overweight, celiac disease, duodenal ulcers, and tobacco use.   As part of a union contract, he had a "heart scan" via electron beam tomography at [facility] on 08/26/15 which revealed a CA+ score of 29 (81st percentile).   He has an extensive tobacco use history (1 PPD since his teenage years).  On 5/25/18 he began to experience substernal chest pain which he described as " an elephant sitting on his chest" associated with SOB.  He called 911 and was taken to [facility], where he was diagnosed with an acute non-ST elevation myocardial infarction and underwent invasive coronary angiography revealing a 100% thrombotic occlusion of the mid LAD with otherwise mild disease.  He went on to have PCI x 2 to his mid LAD with a good result (Joshua Schmitt -[phone], fax [phone]).  He completed cardiac rehab at [facility]. We discussed the results of the TRED-HF trial and agreed to remain on Toprol and losartan despite normalization of his ejection fraction. He had an exercise stress echocardiogram on 06/08/21 which revealed an EF of 63%, resting akinesis apical septum, apical inferior, apical anterior, and apex, post 10.1 METS.  On 09/02/21 he had a CTCA which revealed LM mild, LAD prox mild-to-moderate, mid patent stent, distal moderate, D1/D2 normal, LCX/OM1/OM2 normal, RI mild, RCA mild. RPL/RPDA normal, and an EF of 61%. He had COVID-19 in December, 2021 (likely omicron variant) and had minor URI symptoms.  On 04/11/22 he had an echocardiogram which revealed an EF of 62% and  hypokinesis apical septum, apical anterior wall, apical inferior wall, and apex. At present, he has persistent ROWE and occasional chest pain.  He has lost 12 pounds last visit.  Today, 08/30/22, he had an exercise stress echocardiogram which revealed an EF of 58%, hypokinesis of the apical septum and apex and rest with no new wall motion abnormalities post 3.6 METs of exercise.  He had marked exertional dyspnea during the test. He also performed a 6 minute walk test during which he was able to walk about 300 meters before stopping secondary to ROWE. \par

## 2022-08-30 NOTE — REASON FOR VISIT
[FreeTextEntry1] : Diagnostic Tests:\par --------------------------------\par ECG:\par 08/30/22: sinus rhythm, normal ECG. \par 02/14/22: sinus rhythm, possible old septal MI. \par 08/16/21: sinus rhythm, normal ECG. \par 01/11/21: NSR, normal ECG. \par 06/16/20: sinus rhythm, old anterior MI. \par 08/26/19: NSR, anteroseptal MI. \par 11/14/18: NSR, anteroseptal MI. \par 05/29/18: NSR, anteroseptal MI. \par 02/26/18: NSR, normal ECG.\par 10/14/15: NSR, normal ECG.\par ---------------------------------\par CT:\par 09/02/21: LM mild, LAD prox mild-to-moderate, mid patent stent, distal moderate, D1/D2 normal, LCX/OM1/OM2 normal, RI mild, RCA mild. RPL/RPDA normal, EF 61%.\par 01/18/21: CT calcium score: Ca+ score 953 (90th percentile), LM 0, , LCx 0, RCA 34, PDA 0, 2 LAD stents.\par 09/19/19: CTA head and neck: minimal carotid plaque b/l, no right carotid dissection. \par 08/26/15: EBT Ca+ score: 29, 81st percentile\par ---------------------------------\par Stress:\par 08/30/22: exercise stress echo: EF 58%, hypokinesis apical septum and apex at rest, no new wall motion abnormalities post 3.6 METs of exercise, + exertional dyspnea. \par 06/08/21: exercise stress echo: EF 63%, normal wall motion, PA pressures, and ECG post 10.1 METS. \par 04/21/20: exercise stress echo: EF 59%, resting akinesis apical septum, apical inferior, apical anterior, and apex, post 10 METS. \par 03/04/19: exercise stress echo: EF 60%, akinesis apical anterior, apical septum, and apex at rest, no inducible ischemia and normal PA pressure post 10 METS. \par 02/19/19: ETT only cardiac rehab: 7.5 METS, normal ECG. \par 11/05/15: exercise stress echo: EF 64%, 10 METS, normal wall motion and PA pressures.\par ---------------------------------\par Cath:\par 05/25/18: at [place]: acute NSTEMI LAD: mid % (PCI x 2), otherwise mild disease\par ---------------------------------\par Echo:\par 04/11/22: EF 62%, hypokinesis apical septum, apical anterior wall, apical inferior wall, and apex. \par 02/27/20: EF 68%, akinesis apical septum, apical inferior, apical anterior, and apex. \par 11/30/18: EF 58%, akinesis apical septum and apex, trace MR/TR.\par 06/28/18: EF 48%, grade I diastolic dysfunction, apical septal and apical akinesis, trace MR/TR. \par 05/29/18: EF 35%, large apical MI, mild LVH, trace MR/TR.\par ---------------------------------\par Carotid:\par 09/10/19: sono: b/l prox ICA 1-19%, possible RCCA dissection flap.\par ---------------------------------\par Sleep studies:\par 06/23/21: mils EVERTON. \par

## 2022-11-01 ENCOUNTER — APPOINTMENT (OUTPATIENT)
Dept: GASTROENTEROLOGY | Facility: CLINIC | Age: 47
End: 2022-11-01

## 2022-11-01 VITALS
TEMPERATURE: 97.7 F | WEIGHT: 238 LBS | DIASTOLIC BLOOD PRESSURE: 80 MMHG | RESPIRATION RATE: 16 BRPM | BODY MASS INDEX: 33.32 KG/M2 | OXYGEN SATURATION: 97 % | SYSTOLIC BLOOD PRESSURE: 130 MMHG | HEART RATE: 92 BPM | HEIGHT: 71 IN

## 2022-11-01 DIAGNOSIS — R74.01 ELEVATION OF LEVELS OF LIVER TRANSAMINASE LEVELS: ICD-10-CM

## 2022-11-01 DIAGNOSIS — I25.2 OLD MYOCARDIAL INFARCTION: ICD-10-CM

## 2022-11-01 DIAGNOSIS — Z83.79 FAMILY HISTORY OF OTHER DISEASES OF THE DIGESTIVE SYSTEM: ICD-10-CM

## 2022-11-01 DIAGNOSIS — Z78.9 OTHER SPECIFIED HEALTH STATUS: ICD-10-CM

## 2022-11-01 DIAGNOSIS — Z87.19 PERSONAL HISTORY OF OTHER DISEASES OF THE DIGESTIVE SYSTEM: ICD-10-CM

## 2022-11-01 PROCEDURE — 99204 OFFICE O/P NEW MOD 45 MIN: CPT

## 2022-11-01 RX ORDER — ADHESIVE TAPE 3"X 2.3 YD
50 MCG TAPE, NON-MEDICATED TOPICAL
Qty: 90 | Refills: 0 | Status: DISCONTINUED | COMMUNITY
Start: 2020-10-15 | End: 2022-11-01

## 2022-11-01 NOTE — PHYSICAL EXAM
[Alert] : alert [No Respiratory Distress] : no respiratory distress [Respiration, Rhythm And Depth] : normal respiratory rhythm and effort [Abdomen Tenderness] : non-tender [Abdomen Soft] : soft [Oriented To Time, Place, And Person] : oriented to person, place, and time [Normal Mood] : the mood was normal

## 2022-11-02 LAB
25(OH)D3 SERPL-MCNC: 22.9 NG/ML
ALBUMIN SERPL ELPH-MCNC: 4.6 G/DL
ALP BLD-CCNC: 140 U/L
ALT SERPL-CCNC: 39 U/L
ANION GAP SERPL CALC-SCNC: 13 MMOL/L
AST SERPL-CCNC: 24 U/L
BASOPHILS # BLD AUTO: 0.08 K/UL
BASOPHILS NFR BLD AUTO: 1 %
BILIRUB SERPL-MCNC: 0.8 MG/DL
BUN SERPL-MCNC: 17 MG/DL
CALCIUM SERPL-MCNC: 9.7 MG/DL
CHLORIDE SERPL-SCNC: 103 MMOL/L
CO2 SERPL-SCNC: 22 MMOL/L
CREAT SERPL-MCNC: 0.96 MG/DL
EGFR: 98 ML/MIN/1.73M2
EOSINOPHIL # BLD AUTO: 0.16 K/UL
EOSINOPHIL NFR BLD AUTO: 1.9 %
FERRITIN SERPL-MCNC: 345 NG/ML
FOLATE SERPL-MCNC: >20 NG/ML
GLUCOSE SERPL-MCNC: 121 MG/DL
HCT VFR BLD CALC: 45.8 %
HGB BLD-MCNC: 15.8 G/DL
IGA SER QL IEP: 372 MG/DL
IMM GRANULOCYTES NFR BLD AUTO: 0.6 %
IRON SATN MFR SERPL: 52 %
IRON SERPL-MCNC: 157 UG/DL
LYMPHOCYTES # BLD AUTO: 1.94 K/UL
LYMPHOCYTES NFR BLD AUTO: 23.1 %
MAGNESIUM SERPL-MCNC: 2.2 MG/DL
MAN DIFF?: NORMAL
MCHC RBC-ENTMCNC: 31.2 PG
MCHC RBC-ENTMCNC: 34.5 GM/DL
MCV RBC AUTO: 90.3 FL
MONOCYTES # BLD AUTO: 0.72 K/UL
MONOCYTES NFR BLD AUTO: 8.6 %
NEUTROPHILS # BLD AUTO: 5.44 K/UL
NEUTROPHILS NFR BLD AUTO: 64.8 %
PLATELET # BLD AUTO: 303 K/UL
POTASSIUM SERPL-SCNC: 4.6 MMOL/L
PROT SERPL-MCNC: 7.6 G/DL
RBC # BLD: 5.07 M/UL
RBC # FLD: 11.9 %
SODIUM SERPL-SCNC: 139 MMOL/L
TIBC SERPL-MCNC: 301 UG/DL
TTG IGA SER IA-ACNC: >100 U/ML
TTG IGA SER-ACNC: POSITIVE
TTG IGG SER IA-ACNC: <1.2 U/ML
TTG IGG SER IA-ACNC: NEGATIVE
UIBC SERPL-MCNC: 144 UG/DL
VIT B12 SERPL-MCNC: 441 PG/ML
WBC # FLD AUTO: 8.39 K/UL

## 2022-11-02 NOTE — ASSESSMENT
[FreeTextEntry1] : Patient is a 47 year old male with a history of ASHD s/p PCI with two stent placement, celiac disease, colon polyp, HLD, HTN and EVERTON who presents to establish care.\par \par Patient to undergo lab evaluation today to evaluate for vitamin/nutritional deficiencies due to active celiac disease.  Patient counselled on the importance of adherence to a Gluten free diet.  Patient states that he will try to improve his gluten avoidance but does not plan to eliminate gluten from this diet.\par \par Patient due for repeat colonoscopy in July 2023 due to a history of colon polyp.  Will consider repeat upper endoscopy at that time as well to evaluate the duodenal mucosa and extend of celiac changes.\par \par Patient to schedule follow up office visit in March 2023.\par \par \par Rafael TALLEY; PA, am scribing for and in the presence of Dr. Andrew Rahman the following sections: history of present illness, past medical/family/social history; review of systems; vital signs; physical exam; disposition.\par \par Andrew TALLEY MD, personally performed the services described in the documentation, reviewed the documentation recorded by the scribe in my presence and it accurately and completely records my words and actions.	\par \par

## 2022-11-02 NOTE — HISTORY OF PRESENT ILLNESS
[de-identified] : 7/28/2020 with Dr. Cotter showed villous blunting and multiple duodenal ulcers with class B esophagitis.  Pathology showed second portion of duodenum with acute and chronic duodenitis, focal blunting of duodenal villi and focal increased intraepithelial lymphocytes, acute and chronic inflammation in the antrum.  \par \par 11/10/2020 showed no abnormalities of the duodenal mucosa, and normal antrum.  Pathology showed mildly active chronic gastritis with intestinal metaplasia; negative H. pylori. [FreeTextEntry1] : 7/28/2020: 1 cm polyp in the ascending colon; pathology showed tubular adenoma.  Repeat colonoscopy advised in 3 years.

## 2022-11-03 LAB
COPPER SERPL-MCNC: 93 UG/DL
ZINC SERPL-MCNC: 104 UG/DL

## 2022-11-07 LAB — BETA CAROTENE: 3 UG/DL

## 2022-11-08 LAB
A-TOCOPHEROL VIT E SERPL-MCNC: 9.5 MG/L
BETA+GAMMA TOCOPHEROL SERPL-MCNC: 1 MG/L
VIT A SERPL-MCNC: 56 UG/DL

## 2022-11-09 ENCOUNTER — NON-APPOINTMENT (OUTPATIENT)
Age: 47
End: 2022-11-09

## 2022-11-09 DIAGNOSIS — E55.9 VITAMIN D DEFICIENCY, UNSPECIFIED: ICD-10-CM

## 2022-11-21 ENCOUNTER — NON-APPOINTMENT (OUTPATIENT)
Age: 47
End: 2022-11-21

## 2022-11-21 ENCOUNTER — RX RENEWAL (OUTPATIENT)
Age: 47
End: 2022-11-21

## 2022-11-21 ENCOUNTER — APPOINTMENT (OUTPATIENT)
Dept: HEART AND VASCULAR | Facility: CLINIC | Age: 47
End: 2022-11-21

## 2022-11-21 VITALS
WEIGHT: 236 LBS | SYSTOLIC BLOOD PRESSURE: 112 MMHG | BODY MASS INDEX: 33.04 KG/M2 | HEIGHT: 71 IN | OXYGEN SATURATION: 96 % | DIASTOLIC BLOOD PRESSURE: 70 MMHG | TEMPERATURE: 97.4 F | HEART RATE: 86 BPM

## 2022-11-21 PROCEDURE — 99214 OFFICE O/P EST MOD 30 MIN: CPT | Mod: 25

## 2022-11-21 PROCEDURE — 93000 ELECTROCARDIOGRAM COMPLETE: CPT

## 2022-11-21 NOTE — REVIEW OF SYSTEMS
[Dyspnea on exertion] : dyspnea during exertion [Chest Discomfort] : chest discomfort [Negative] : Heme/Lymph [Feeling Fatigued] : feeling fatigued [SOB] : shortness of breath

## 2022-11-21 NOTE — HISTORY OF PRESENT ILLNESS
[FreeTextEntry1] : Mr. Mott presents for follow up and management of CAD s/p apical MI with PCI LAD 05/25/18, dyslipidemia, overweight, celiac disease, duodenal ulcers, and tobacco use.   As part of a union contract, he had a "heart scan" via electron beam tomography at [facility] on 08/26/15 which revealed a CA+ score of 29 (81st percentile).   He has an extensive tobacco use history (1 PPD since his teenage years).  On 5/25/18 he began to experience substernal chest pain which he described as " an elephant sitting on his chest" associated with SOB.  He called 911 and was taken to [facility], where he was diagnosed with an acute non-ST elevation myocardial infarction and underwent invasive coronary angiography revealing a 100% thrombotic occlusion of the mid LAD with otherwise mild disease.  He went on to have PCI x 2 to his mid LAD with a good result (Joshua Schmitt -[phone], fax [phone]).  He completed cardiac rehab at [facility]. We discussed the results of the TRED-HF trial and agreed to remain on Toprol and losartan despite normalization of his ejection fraction. He had an exercise stress echocardiogram on 06/08/21 which revealed an EF of 63%, resting akinesis apical septum, apical inferior, apical anterior, and apex, post 10.1 METS.  On 09/02/21 he had a CTCA which revealed LM mild, LAD prox mild-to-moderate, mid patent stent, distal moderate, D1/D2 normal, LCX/OM1/OM2 normal, RI mild, RCA mild. RPL/RPDA normal, and an EF of 61%. He had COVID-19 in December, 2021 (likely omicron variant) and had minor URI symptoms.  On 04/11/22 he had an echocardiogram which revealed an EF of 62% and  hypokinesis apical septum, apical anterior wall, apical inferior wall, and apex. At present, he has persistent ROWE and occasional chest pain. On 08/30/22, he had an exercise stress echocardiogram which revealed an EF of 58%, hypokinesis of the apical septum and apex and rest with no new wall motion abnormalities post 3.6 METs of exercise.  He had marked exertional dyspnea during the test. He also performed a 6 minute walk test during which he was able to walk about 300 meters before stopping secondary to ROWE. \par

## 2022-11-21 NOTE — REASON FOR VISIT
[Other: ____] : [unfilled] [FreeTextEntry1] : Diagnostic Tests:\par --------------------------------\par ECG:\par 11/21/22: sinus rhythm, normal ECG. \par 08/30/22: sinus rhythm, normal ECG. \par 02/14/22: sinus rhythm, possible old septal MI. \par 08/16/21: sinus rhythm, normal ECG. \par 01/11/21: NSR, normal ECG. \par 06/16/20: sinus rhythm, old anterior MI. \par 08/26/19: NSR, anteroseptal MI. \par 11/14/18: NSR, anteroseptal MI. \par 05/29/18: NSR, anteroseptal MI. \par 02/26/18: NSR, normal ECG.\par 10/14/15: NSR, normal ECG.\par ---------------------------------\par CT:\par 09/02/21: LM mild, LAD prox mild-to-moderate, mid patent stent, distal moderate, D1/D2 normal, LCX/OM1/OM2 normal, RI mild, RCA mild. RPL/RPDA normal, EF 61%.\par 01/18/21: CT calcium score: Ca+ score 953 (90th percentile), LM 0, , LCx 0, RCA 34, PDA 0, 2 LAD stents.\par 09/19/19: CTA head and neck: minimal carotid plaque b/l, no right carotid dissection. \par 08/26/15: EBT Ca+ score: 29, 81st percentile\par ---------------------------------\par Stress:\par 08/30/22: exercise stress echo: EF 58%, hypokinesis apical septum and apex at rest, no new wall motion abnormalities post 3.6 METs of exercise, + exertional dyspnea. \par 06/08/21: exercise stress echo: EF 63%, normal wall motion, PA pressures, and ECG post 10.1 METS. \par 04/21/20: exercise stress echo: EF 59%, resting akinesis apical septum, apical inferior, apical anterior, and apex, post 10 METS. \par 03/04/19: exercise stress echo: EF 60%, akinesis apical anterior, apical septum, and apex at rest, no inducible ischemia and normal PA pressure post 10 METS. \par 02/19/19: ETT only cardiac rehab: 7.5 METS, normal ECG. \par 11/05/15: exercise stress echo: EF 64%, 10 METS, normal wall motion and PA pressures.\par ---------------------------------\par Cath:\par 05/25/18: at [place]: acute NSTEMI LAD: mid % (PCI x 2), otherwise mild disease\par ---------------------------------\par Echo:\par 04/11/22: EF 62%, hypokinesis apical septum, apical anterior wall, apical inferior wall, and apex. \par 02/27/20: EF 68%, akinesis apical septum, apical inferior, apical anterior, and apex. \par 11/30/18: EF 58%, akinesis apical septum and apex, trace MR/TR.\par 06/28/18: EF 48%, grade I diastolic dysfunction, apical septal and apical akinesis, trace MR/TR. \par 05/29/18: EF 35%, large apical MI, mild LVH, trace MR/TR.\par ---------------------------------\par Carotid:\par 09/10/19: sono: b/l prox ICA 1-19%, possible RCCA dissection flap.\par ---------------------------------\par Sleep studies:\par 06/23/21: mils EVERTON. \par

## 2022-11-21 NOTE — ASSESSMENT
[FreeTextEntry1] : 1. CAD: s/p apical MI s/p PCI LAD (05/28/18): s/p CTCA 09/02/21: LM mild, LAD prox mild-to-moderate, mid patent stent, distal moderate, D1/D2 normal, LCX/OM1/OM2 normal, RI mild, RCA mild. RPL/RPDA normal, EF 61%.\par             - will pursue aggressive medical management and lifestyle intervention\par             - continue atorvastatin 80mg po daily \par             - continue clopidogrel 75mg po qd\par             - continue off ASA 81mg po qd given multiple duodenal ulcers\par             - discussed with patient importance of seeking immediate medical attention if anginal type chest pain develops \par \par 2. Prior tobacco use: \par             - has not smoked since 05/25/18\par             - d/w importance of continued strict abstinence from tobacco\par  \par 3. Systolic heart failure: EF normalized: \par             - continue Toprol XL 50mg po daily\par             - continue losartan 50mg po daily\par \par 4. Carotid artery disease: mild \par            - will pursue medical management\par     \par 5. Duodenal ulcers:\par            - continue off ASA as above\par            - follow up with gastroenterologist\par \par 6. EVERTON: mild\par            - continue therapeutic lifestyle changes\par  \par 7. Dyslipidemia: \par            - continue atorvastatin 80mg po qd\par            - discussed therapeutic lifestyle changes to promote improved lipid metabolism \par            - check repeat lipid profile next visit        \par \par

## 2022-11-25 ENCOUNTER — RX RENEWAL (OUTPATIENT)
Age: 47
End: 2022-11-25

## 2022-11-25 RX ORDER — SILDENAFIL 100 MG/1
100 TABLET, FILM COATED ORAL
Qty: 6 | Refills: 5 | Status: ACTIVE | COMMUNITY
Start: 2019-02-25 | End: 1900-01-01

## 2023-03-28 ENCOUNTER — NON-APPOINTMENT (OUTPATIENT)
Age: 48
End: 2023-03-28

## 2023-03-28 ENCOUNTER — APPOINTMENT (OUTPATIENT)
Dept: HEART AND VASCULAR | Facility: CLINIC | Age: 48
End: 2023-03-28
Payer: MEDICARE

## 2023-03-28 VITALS
BODY MASS INDEX: 33.74 KG/M2 | HEIGHT: 71 IN | SYSTOLIC BLOOD PRESSURE: 109 MMHG | WEIGHT: 241 LBS | HEART RATE: 68 BPM | OXYGEN SATURATION: 96 % | DIASTOLIC BLOOD PRESSURE: 72 MMHG

## 2023-03-28 PROCEDURE — 99214 OFFICE O/P EST MOD 30 MIN: CPT | Mod: 25

## 2023-03-28 PROCEDURE — 93000 ELECTROCARDIOGRAM COMPLETE: CPT

## 2023-03-28 NOTE — REVIEW OF SYSTEMS
[Feeling Fatigued] : feeling fatigued [SOB] : shortness of breath [Dyspnea on exertion] : dyspnea during exertion [Chest Discomfort] : chest discomfort [Negative] : Heme/Lymph [Snoring] : snoring

## 2023-03-28 NOTE — ASSESSMENT
[FreeTextEntry1] : 1. CAD: s/p apical MI s/p PCI LAD (05/28/18): s/p CTCA 09/02/21: LM mild, LAD prox mild-to-moderate, mid patent stent, distal moderate, D1/D2 normal, LCX/OM1/OM2 normal, RI mild, RCA mild. RPL/RPDA normal, EF 61%.\par             - will pursue aggressive medical management and lifestyle intervention\par             - continue atorvastatin 80mg po daily \par             - continue clopidogrel 75mg po qd\par             - continue off ASA 81mg po qd given multiple duodenal ulcers\par             - discussed with patient importance of seeking immediate medical attention if anginal type chest pain develops \par \par 2. Prior tobacco use: \par             - has not smoked since 05/25/18\par             - d/w importance of continued strict abstinence from tobacco\par  \par 3. Systolic heart failure: EF normalized: \par             - continue Toprol XL 50mg po daily\par             - continue losartan 50mg po daily\par \par 4. Carotid artery disease: mild \par            - will pursue medical management\par     \par 5. Duodenal ulcers:\par            - continue off ASA as above\par            - follow up with gastroenterologist\par \par 6. EVERTON: mild (06/23/21): + increased night time apnea episodes:\par            - will order a repeat sleep study \par            - continue therapeutic lifestyle changes\par  \par 7. Dyslipidemia: \par            - continue atorvastatin 80mg po qd\par            - discussed therapeutic lifestyle changes to promote improved lipid metabolism \par            - check repeat lipid profile next visit        \par \par

## 2023-03-28 NOTE — HISTORY OF PRESENT ILLNESS
[FreeTextEntry1] : Mr. Mott presents for follow up and management of CAD s/p apical MI with PCI LAD 05/25/18, dyslipidemia, overweight, celiac disease, duodenal ulcers, and tobacco use.   As part of a union contract, he had a "heart scan" via electron beam tomography at [facility] on 08/26/15 which revealed a CA+ score of 29 (81st percentile).   He has an extensive tobacco use history (1 PPD since his teenage years).  On 5/25/18 he began to experience substernal chest pain which he described as " an elephant sitting on his chest" associated with SOB.  He called 911 and was taken to [facility], where he was diagnosed with an acute non-ST elevation myocardial infarction and underwent invasive coronary angiography revealing a 100% thrombotic occlusion of the mid LAD with otherwise mild disease.  He went on to have PCI x 2 to his mid LAD with a good result (Joshua Schmitt -[phone], fax [phone]).  He completed cardiac rehab at [facility]. We discussed the results of the TRED-HF trial and agreed to remain on Toprol and losartan despite normalization of his ejection fraction. He had an exercise stress echocardiogram on 06/08/21 which revealed an EF of 63%, resting akinesis apical septum, apical inferior, apical anterior, and apex, post 10.1 METS.  On 09/02/21 he had a CTCA which revealed LM mild, LAD prox mild-to-moderate, mid patent stent, distal moderate, D1/D2 normal, LCX/OM1/OM2 normal, RI mild, RCA mild. RPL/RPDA normal, and an EF of 61%. He had COVID-19 in December, 2021 (likely omicron variant) and had minor URI symptoms.  On 04/11/22 he had an echocardiogram which revealed an EF of 62% and  hypokinesis apical septum, apical anterior wall, apical inferior wall, and apex. At present, he has persistent ROWE and occasional chest pain. On 08/30/22, he had an exercise stress echocardiogram which revealed an EF of 58%, hypokinesis of the apical septum and apex and rest with no new wall motion abnormalities post 3.6 METs of exercise.  He had marked exertional dyspnea during the test. He also performed a 6 minute walk test during which he was able to walk about 300 meters before stopping secondary to ROWE. In addition, he has complaints of increased night time apnea episodes and we discussed rescreening him for significant EVERTON. \par \par Since his last visit he has been well but feels ROWE at times and intermittent sharp pain. EKG today shows no ischemic changes. Last night he had a few episode of SOB in bed.

## 2023-03-28 NOTE — REASON FOR VISIT
[Other: ____] : [unfilled] [FreeTextEntry1] : Diagnostic Tests:\par --------------------------------\par ECG:\par 03/28/23:  sinus rhythm, normal ECG. \par 11/21/22: sinus rhythm, normal ECG. \par 08/30/22: sinus rhythm, normal ECG. \par 02/14/22: sinus rhythm, possible old septal MI. \par 08/16/21: sinus rhythm, normal ECG. \par 01/11/21: NSR, normal ECG. \par 06/16/20: sinus rhythm, old anterior MI. \par 08/26/19: NSR, anteroseptal MI. \par 11/14/18: NSR, anteroseptal MI. \par 05/29/18: NSR, anteroseptal MI. \par 02/26/18: NSR, normal ECG.\par 10/14/15: NSR, normal ECG.\par ---------------------------------\par CT:\par 09/02/21: LM mild, LAD prox mild-to-moderate, mid patent stent, distal moderate, D1/D2 normal, LCX/OM1/OM2 normal, RI mild, RCA mild. RPL/RPDA normal, EF 61%.\par 01/18/21: CT calcium score: Ca+ score 953 (90th percentile), LM 0, , LCx 0, RCA 34, PDA 0, 2 LAD stents.\par 09/19/19: CTA head and neck: minimal carotid plaque b/l, no right carotid dissection. \par 08/26/15: EBT Ca+ score: 29, 81st percentile\par ---------------------------------\par Stress:\par 08/30/22: exercise stress echo: EF 58%, hypokinesis apical septum and apex at rest, no new wall motion abnormalities post 3.6 METs of exercise, + exertional dyspnea. \par 06/08/21: exercise stress echo: EF 63%, normal wall motion, PA pressures, and ECG post 10.1 METS. \par 04/21/20: exercise stress echo: EF 59%, resting akinesis apical septum, apical inferior, apical anterior, and apex, post 10 METS. \par 03/04/19: exercise stress echo: EF 60%, akinesis apical anterior, apical septum, and apex at rest, no inducible ischemia and normal PA pressure post 10 METS. \par 02/19/19: ETT only cardiac rehab: 7.5 METS, normal ECG. \par 11/05/15: exercise stress echo: EF 64%, 10 METS, normal wall motion and PA pressures.\par ---------------------------------\par Cath:\par 05/25/18: at [place]: acute NSTEMI LAD: mid % (PCI x 2), otherwise mild disease\par ---------------------------------\par Echo:\par 04/11/22: EF 62%, hypokinesis apical septum, apical anterior wall, apical inferior wall, and apex. \par 02/27/20: EF 68%, akinesis apical septum, apical inferior, apical anterior, and apex. \par 11/30/18: EF 58%, akinesis apical septum and apex, trace MR/TR.\par 06/28/18: EF 48%, grade I diastolic dysfunction, apical septal and apical akinesis, trace MR/TR. \par 05/29/18: EF 35%, large apical MI, mild LVH, trace MR/TR.\par ---------------------------------\par Carotid:\par 09/10/19: sono: b/l prox ICA 1-19%, possible RCCA dissection flap.\par ---------------------------------\par Sleep studies:\par 06/23/21: mils EVERTON. \par

## 2023-04-06 ENCOUNTER — APPOINTMENT (OUTPATIENT)
Dept: HEART AND VASCULAR | Facility: CLINIC | Age: 48
End: 2023-04-06
Payer: MEDICARE

## 2023-04-06 PROCEDURE — 36415 COLL VENOUS BLD VENIPUNCTURE: CPT

## 2023-04-07 LAB
ALBUMIN SERPL ELPH-MCNC: 4.5 G/DL
ALP BLD-CCNC: 108 U/L
ALT SERPL-CCNC: 38 U/L
ANION GAP SERPL CALC-SCNC: 13 MMOL/L
AST SERPL-CCNC: 27 U/L
BASOPHILS # BLD AUTO: 0.06 K/UL
BASOPHILS NFR BLD AUTO: 0.7 %
BILIRUB SERPL-MCNC: 0.5 MG/DL
BUN SERPL-MCNC: 16 MG/DL
CALCIUM SERPL-MCNC: 8.9 MG/DL
CHLORIDE SERPL-SCNC: 102 MMOL/L
CHOLEST SERPL-MCNC: 116 MG/DL
CO2 SERPL-SCNC: 23 MMOL/L
CREAT SERPL-MCNC: 0.91 MG/DL
EGFR: 105 ML/MIN/1.73M2
EOSINOPHIL # BLD AUTO: 0.18 K/UL
EOSINOPHIL NFR BLD AUTO: 2.1 %
ESTIMATED AVERAGE GLUCOSE: 117 MG/DL
GLUCOSE SERPL-MCNC: 142 MG/DL
HBA1C MFR BLD HPLC: 5.7 %
HCT VFR BLD CALC: 44.6 %
HDLC SERPL-MCNC: 34 MG/DL
HGB BLD-MCNC: 14.7 G/DL
IMM GRANULOCYTES NFR BLD AUTO: 1.6 %
LDLC SERPL DIRECT ASSAY-MCNC: 59 MG/DL
LYMPHOCYTES # BLD AUTO: 2.39 K/UL
LYMPHOCYTES NFR BLD AUTO: 27.6 %
MAN DIFF?: NORMAL
MCHC RBC-ENTMCNC: 31.5 PG
MCHC RBC-ENTMCNC: 33 GM/DL
MCV RBC AUTO: 95.7 FL
MONOCYTES # BLD AUTO: 0.49 K/UL
MONOCYTES NFR BLD AUTO: 5.7 %
NEUTROPHILS # BLD AUTO: 5.4 K/UL
NEUTROPHILS NFR BLD AUTO: 62.3 %
PLATELET # BLD AUTO: 281 K/UL
POTASSIUM SERPL-SCNC: 4.7 MMOL/L
PROT SERPL-MCNC: 7.3 G/DL
RBC # BLD: 4.66 M/UL
RBC # FLD: 13.4 %
SODIUM SERPL-SCNC: 139 MMOL/L
TRIGL SERPL-MCNC: 154 MG/DL
TSH SERPL-ACNC: 1.12 UIU/ML
WBC # FLD AUTO: 8.66 K/UL

## 2023-04-12 ENCOUNTER — APPOINTMENT (OUTPATIENT)
Dept: GASTROENTEROLOGY | Facility: CLINIC | Age: 48
End: 2023-04-12
Payer: MEDICARE

## 2023-04-12 VITALS
DIASTOLIC BLOOD PRESSURE: 79 MMHG | BODY MASS INDEX: 33.18 KG/M2 | HEIGHT: 71 IN | TEMPERATURE: 97.4 F | SYSTOLIC BLOOD PRESSURE: 118 MMHG | WEIGHT: 237 LBS | HEART RATE: 78 BPM | OXYGEN SATURATION: 97 %

## 2023-04-12 DIAGNOSIS — K76.0 FATTY (CHANGE OF) LIVER, NOT ELSEWHERE CLASSIFIED: ICD-10-CM

## 2023-04-12 DIAGNOSIS — Z86.010 PERSONAL HISTORY OF COLONIC POLYPS: ICD-10-CM

## 2023-04-12 PROCEDURE — 99214 OFFICE O/P EST MOD 30 MIN: CPT

## 2023-04-12 RX ORDER — POLYETHYLENE GLYCOL 3350 AND ELECTROLYTES WITH LEMON FLAVOR 236; 22.74; 6.74; 5.86; 2.97 G/4L; G/4L; G/4L; G/4L; G/4L
236 POWDER, FOR SOLUTION ORAL
Qty: 1 | Refills: 0 | Status: ACTIVE | COMMUNITY
Start: 2023-04-12 | End: 1900-01-01

## 2023-04-12 NOTE — ASSESSMENT
[FreeTextEntry1] : 46 yo male with hx of colon polyps and celiac dz here for f/u\par \par - Will check an abdominal US to follow up hepatic steatosis\par - Will refer to ENT for left side of nose blockage and congestion\par - EGD and a colonoscopy at [facility]  -- pt prefers May 12-21 or June\par - D/w pt regarding escort post-procedure\par - Risks of the procedure including bleeding, perforation, etc d/w the patient\par - GoLYTELY split prep \par - Hold plavix for 5-7 days prior (will check with cardiology)\par

## 2023-04-12 NOTE — HISTORY OF PRESENT ILLNESS
[de-identified] : 7/28/2020 with Dr. Cotter showed villous blunting and multiple duodenal ulcers with class B esophagitis. Pathology showed second portion of duodenum with acute and chronic duodenitis, focal blunting of duodenal villi and focal increased intraepithelial lymphocytes, acute and chronic inflammation in the antrum. \par \par 11/10/2020 showed no abnormalities of the duodenal mucosa, and normal antrum. Pathology showed mildly active chronic gastritis with intestinal metaplasia; negative H. pylori.  [FreeTextEntry1] : 7/28/2020: 1 cm polyp in the ascending colon; pathology showed tubular adenoma. Repeat colonoscopy advised in 3 years. \par  [de-identified] : 7/16/2020\par Hepatic steatosis, no evidence of cholelithiasis or acute cholecystitis

## 2023-04-20 ENCOUNTER — OUTPATIENT (OUTPATIENT)
Dept: OUTPATIENT SERVICES | Facility: HOSPITAL | Age: 48
LOS: 1 days | End: 2023-04-20

## 2023-04-20 ENCOUNTER — APPOINTMENT (OUTPATIENT)
Dept: ULTRASOUND IMAGING | Facility: CLINIC | Age: 48
End: 2023-04-20
Payer: MEDICARE

## 2023-04-20 PROCEDURE — 76700 US EXAM ABDOM COMPLETE: CPT | Mod: 26

## 2023-04-24 ENCOUNTER — APPOINTMENT (OUTPATIENT)
Dept: OTOLARYNGOLOGY | Facility: CLINIC | Age: 48
End: 2023-04-24

## 2023-05-01 ENCOUNTER — RX RENEWAL (OUTPATIENT)
Age: 48
End: 2023-05-01

## 2023-05-02 ENCOUNTER — APPOINTMENT (OUTPATIENT)
Dept: OTOLARYNGOLOGY | Facility: CLINIC | Age: 48
End: 2023-05-02
Payer: MEDICARE

## 2023-05-02 VITALS
DIASTOLIC BLOOD PRESSURE: 83 MMHG | BODY MASS INDEX: 32.2 KG/M2 | HEART RATE: 85 BPM | WEIGHT: 230 LBS | OXYGEN SATURATION: 97 % | SYSTOLIC BLOOD PRESSURE: 117 MMHG | HEIGHT: 71 IN | TEMPERATURE: 98.2 F

## 2023-05-02 DIAGNOSIS — H90.3 SENSORINEURAL HEARING LOSS, BILATERAL: ICD-10-CM

## 2023-05-02 DIAGNOSIS — J30.2 OTHER SEASONAL ALLERGIC RHINITIS: ICD-10-CM

## 2023-05-02 PROCEDURE — 99214 OFFICE O/P EST MOD 30 MIN: CPT | Mod: 25

## 2023-05-02 PROCEDURE — 31231 NASAL ENDOSCOPY DX: CPT

## 2023-05-02 NOTE — CONSULT LETTER
[Dear  ___] : Dear  [unfilled], [Consult Letter:] : I had the pleasure of evaluating your patient, [unfilled]. [Please see my note below.] : Please see my note below. [Consult Closing:] : Thank you very much for allowing me to participate in the care of this patient.  If you have any questions, please do not hesitate to contact me. [Sincerely,] : Sincerely, [FreeTextEntry3] : COURTNEY Quevedo Jr, MD, FAAOHNS\par Otolaryngologist\par [facility]

## 2023-05-02 NOTE — DATA REVIEWED
[de-identified] : 6/21: R nl to mod SNHL, L nl to mod-sev MHL\par - Immitance testing w/ type A AU\par today: deferred at pt's request [de-identified] : 6/21 HSAT: pAHI 6.6, LSat 89%
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
## 2023-05-02 NOTE — PROCEDURE
[FreeTextEntry6] : Indication: requirement for exam not possible via anterior rhinoscopy; chronic nasal obstruction\par After verbal consent and the administration of an aerosolized phenylephrine/lidocaine mix, examination was performed with a flexible endoscope attached to a video monitoring system. Findings:\par Septum: widely to the R\par Mucosa: normal\par Polyposis: not present\par Inferior turbinates: very large, pos Afrin test\par Middle and superior turbinates: normal\par Inferior meatus: unremarkable\par Middle meatus: unremarkable\par Superior meatus: unremarkable\par Speno-ethmoidal recess: unremarkable\par Nasopharynx: unremarkable\par Secretions: unremarkable\par Other findings: none

## 2023-05-02 NOTE — PHYSICAL EXAM
[Nasal Endoscopy Performed] : nasal endoscopy was performed, see procedure section for findings [de-identified] : very large ITs [Laryngoscopy Performed] : laryngoscopy was performed, see procedure section for findings [Normal] : no masses and lesions seen, face is symmetric

## 2023-05-15 ENCOUNTER — OUTPATIENT (OUTPATIENT)
Dept: OUTPATIENT SERVICES | Facility: HOSPITAL | Age: 48
LOS: 1 days | Discharge: HOME | End: 2023-05-15
Payer: MEDICARE

## 2023-05-15 ENCOUNTER — APPOINTMENT (OUTPATIENT)
Dept: GASTROENTEROLOGY | Facility: HOSPITAL | Age: 48
End: 2023-05-15

## 2023-05-15 ENCOUNTER — RESULT REVIEW (OUTPATIENT)
Age: 48
End: 2023-05-15

## 2023-05-15 VITALS
HEIGHT: 70 IN | SYSTOLIC BLOOD PRESSURE: 109 MMHG | OXYGEN SATURATION: 95 % | HEART RATE: 87 BPM | RESPIRATION RATE: 16 BRPM | WEIGHT: 235.01 LBS | DIASTOLIC BLOOD PRESSURE: 66 MMHG

## 2023-05-15 PROCEDURE — 88305 TISSUE EXAM BY PATHOLOGIST: CPT

## 2023-05-15 PROCEDURE — 45378 DIAGNOSTIC COLONOSCOPY: CPT

## 2023-05-15 PROCEDURE — 43239 EGD BIOPSY SINGLE/MULTIPLE: CPT | Mod: 59

## 2023-05-15 PROCEDURE — 43239 EGD BIOPSY SINGLE/MULTIPLE: CPT

## 2023-05-15 PROCEDURE — 88305 TISSUE EXAM BY PATHOLOGIST: CPT | Mod: 26

## 2023-05-15 NOTE — PRE-ANESTHESIA EVALUATION ADULT - NSANTHPEFT_GEN_ALL_CORE
Alert and oriented X 3 in no acute distress  Heart RRR  Lungs Breathing comfortably, no tachypnea no

## 2023-05-15 NOTE — PRE-ANESTHESIA EVALUATION ADULT - NSANTHPMHFT_GEN_ALL_CORE
hx of CAD s/pstent, carotid  artery stenosis, HTN, hyperparathyroidism, hypertriglyceridemia, EVERTON, obesity, Sensorineural hearing loss, Vitamin D deficiency, colon polyps

## 2023-05-16 LAB — SURGICAL PATHOLOGY STUDY: SIGNIFICANT CHANGE UP

## 2023-05-18 ENCOUNTER — APPOINTMENT (OUTPATIENT)
Dept: SLEEP CENTER | Facility: HOME HEALTH | Age: 48
End: 2023-05-18
Payer: MEDICARE

## 2023-05-18 ENCOUNTER — OUTPATIENT (OUTPATIENT)
Dept: OUTPATIENT SERVICES | Facility: HOSPITAL | Age: 48
LOS: 1 days | End: 2023-05-18
Payer: MEDICARE

## 2023-05-18 DIAGNOSIS — G47.33 OBSTRUCTIVE SLEEP APNEA (ADULT) (PEDIATRIC): ICD-10-CM

## 2023-05-18 PROCEDURE — 95800 SLP STDY UNATTENDED: CPT

## 2023-05-18 PROCEDURE — G0400: CPT | Mod: 26

## 2023-05-23 ENCOUNTER — NON-APPOINTMENT (OUTPATIENT)
Age: 48
End: 2023-05-23

## 2023-05-25 ENCOUNTER — APPOINTMENT (OUTPATIENT)
Dept: PULMONOLOGY | Facility: CLINIC | Age: 48
End: 2023-05-25
Payer: MEDICARE

## 2023-05-25 VITALS
BODY MASS INDEX: 34.16 KG/M2 | DIASTOLIC BLOOD PRESSURE: 75 MMHG | HEIGHT: 71 IN | HEART RATE: 90 BPM | OXYGEN SATURATION: 96 % | SYSTOLIC BLOOD PRESSURE: 122 MMHG | TEMPERATURE: 96.8 F | WEIGHT: 244 LBS

## 2023-05-25 DIAGNOSIS — J34.3 HYPERTROPHY OF NASAL TURBINATES: ICD-10-CM

## 2023-05-25 PROCEDURE — 99204 OFFICE O/P NEW MOD 45 MIN: CPT

## 2023-05-25 NOTE — HISTORY OF PRESENT ILLNESS
[FreeTextEntry1] : 05/25/2023 :  KELLIE PEACE is a 47 year old male who is being evaluated for sleep disordered breathing.  He was sent for a recent home sleep study because of results of awakening with shortness of breath.  His wife tells him that he snores severely, and she has probably observed him choking or gasping at night.  Usual bedtime is midnight, sleep latency 5 minutes, awakens 3 times briefly on a typical night before getting up at 9 AM.  He does not report significant daytime sleepiness, [place] sleepiness score of 5 out of 24.  He does not awaken with a headache.  He generally feels rested in the morning.  He does awaken in the morning with nasal or sinus congestion frequently.  There is no history of cataplexy, sleep paralysis, or parasomnia.  He has been evaluated by Dr. Quevedo for chronic nasal congestion and has been considered for nasal or sinus surgery.\par \par Over the past 6 months he has gained between 10 and 15 pounds.  Family history positive for a brother on CPAP for sleep apnea.  He has past history of myocardial infarction.  He is a retired .

## 2023-05-25 NOTE — ASSESSMENT
[FreeTextEntry1] : Home sleep study reviewed from the night of May 18, 2023.  This showed moderate sleep disordered breathing with apnea-hypopnea index of 24 (4%), less than 1 minute below 90% oxygen saturation.  The patient tells me this was not a typical night because of her medication use that night.  A prior sleep study done June 23, 2021 showed only very mild sleep apnea, with apnea-hypopnea index 6.6.\par \par I am not convinced that this home sleep study is adequate to diagnose significant sleep disordered breathing.  Based on his description of this night I think this needs to be repeated before committing him to treatment for obstructive sleep apnea.  I would like to repeat his home sleep study, alternatively we could bring him into the sleep center for definitive polysomnography.  For now I will repeat his home sleep study.

## 2023-05-25 NOTE — PHYSICAL EXAM
[General Appearance - Well Developed] : well developed [Normal Appearance] : normal appearance [Well Groomed] : well groomed [General Appearance - Well Nourished] : well nourished [No Deformities] : no deformities [General Appearance - In No Acute Distress] : no acute distress [Normal Conjunctiva] : the conjunctiva exhibited no abnormalities [Eyelids - No Xanthelasma] : the eyelids demonstrated no xanthelasmas [Normal Oropharynx] : normal oropharynx [Low Lying Soft Palate] : low lying soft palate [Erythema] : erythema of the pharynx [III] : III [FreeTextEntry1] : posterior exudate [Heart Rate And Rhythm] : heart rate was normal and rhythm regular [Heart Sounds] : normal S1 and S2 [Heart Sounds Gallop] : no gallops [Murmurs] : no murmurs [Heart Sounds Pericardial Friction Rub] : no pericardial rub [Auscultation Breath Sounds / Voice Sounds] : lungs were clear to auscultation bilaterally [Abnormal Walk] : normal gait [Musculoskeletal - Swelling] : no joint swelling seen [Motor Tone] : muscle strength and tone were normal [Nail Clubbing] : no clubbing of the fingernails [Cyanosis, Localized] : no localized cyanosis [Petechial Hemorrhages (___cm)] : no petechial hemorrhages [] : no ischemic changes [Oriented To Time, Place, And Person] : oriented to person, place, and time [Impaired Insight] : insight and judgment were intact [Affect] : the affect was normal

## 2023-06-08 ENCOUNTER — APPOINTMENT (OUTPATIENT)
Dept: OTOLARYNGOLOGY | Facility: CLINIC | Age: 48
End: 2023-06-08

## 2023-07-21 ENCOUNTER — APPOINTMENT (OUTPATIENT)
Dept: SLEEP CENTER | Facility: HOME HEALTH | Age: 48
End: 2023-07-21
Payer: MEDICARE

## 2023-07-21 ENCOUNTER — OUTPATIENT (OUTPATIENT)
Dept: OUTPATIENT SERVICES | Facility: HOSPITAL | Age: 48
LOS: 1 days | End: 2023-07-21
Payer: MEDICARE

## 2023-07-21 PROCEDURE — 95800 SLP STDY UNATTENDED: CPT

## 2023-07-21 PROCEDURE — G0400: CPT | Mod: 26

## 2023-07-25 DIAGNOSIS — G47.33 OBSTRUCTIVE SLEEP APNEA (ADULT) (PEDIATRIC): ICD-10-CM

## 2023-08-11 ENCOUNTER — RX RENEWAL (OUTPATIENT)
Age: 48
End: 2023-08-11

## 2023-08-11 RX ORDER — LOSARTAN POTASSIUM 50 MG/1
50 TABLET, FILM COATED ORAL DAILY
Qty: 180 | Refills: 3 | Status: ACTIVE | COMMUNITY
Start: 2021-08-30 | End: 1900-01-01

## 2023-08-28 ENCOUNTER — APPOINTMENT (OUTPATIENT)
Dept: HEART AND VASCULAR | Facility: CLINIC | Age: 48
End: 2023-08-28
Payer: MEDICARE

## 2023-08-28 VITALS
WEIGHT: 239.06 LBS | HEART RATE: 78 BPM | HEIGHT: 71 IN | BODY MASS INDEX: 33.47 KG/M2 | DIASTOLIC BLOOD PRESSURE: 63 MMHG | SYSTOLIC BLOOD PRESSURE: 97 MMHG | OXYGEN SATURATION: 98 %

## 2023-08-28 VITALS — DIASTOLIC BLOOD PRESSURE: 75 MMHG | SYSTOLIC BLOOD PRESSURE: 100 MMHG

## 2023-08-28 PROCEDURE — 99214 OFFICE O/P EST MOD 30 MIN: CPT

## 2023-08-28 NOTE — HISTORY OF PRESENT ILLNESS
[FreeTextEntry1] : Mr. Mott presents for follow up and management of CAD s/p apical MI with PCI LAD 05/25/18, dyslipidemia, overweight, celiac disease, duodenal ulcers, and tobacco use.   As part of a union contract, he had a "heart scan" via electron beam tomography at [facility] on 08/26/15 which revealed a CA+ score of 29 (81st percentile).   He has an extensive tobacco use history (1 PPD since his teenage years).  On 5/25/18 he began to experience substernal chest pain which he described as " an elephant sitting on his chest" associated with SOB.  He called 911 and was taken to [facility], where he was diagnosed with an acute non-ST elevation myocardial infarction and underwent invasive coronary angiography revealing a 100% thrombotic occlusion of the mid LAD with otherwise mild disease.  He went on to have PCI x 2 to his mid LAD with a good result (Joshua Schmitt -[phone], fax [phone]).  He completed cardiac rehab at [facility]. We discussed the results of the TRED-HF trial and agreed to remain on Toprol and losartan despite normalization of his ejection fraction. He had an exercise stress echocardiogram on 06/08/21 which revealed an EF of 63%, resting akinesis apical septum, apical inferior, apical anterior, and apex, post 10.1 METS.  On 09/02/21 he had a CTCA which revealed LM mild, LAD prox mild-to-moderate, mid patent stent, distal moderate, D1/D2 normal, LCX/OM1/OM2 normal, RI mild, RCA mild. RPL/RPDA normal, and an EF of 61%. He had COVID-19 in December, 2021 (likely omicron variant) and had minor URI symptoms.  On 04/11/22 he had an echocardiogram which revealed an EF of 62% and hypokinesis apical septum, apical anterior wall, apical inferior wall, and apex. At present, he has persistent ROWE and occasional chest pain. On 08/30/22, he had an exercise stress echocardiogram which revealed an EF of 58%, hypokinesis of the apical septum and apex and rest with no new wall motion abnormalities post 3.6 METs of exercise.  He had marked exertional dyspnea during the test. He also performed a 6-minute walk test during which he was able to walk about 300 meters before stopping secondary to ROWE.  He had complaints of frontal headache and had a CT and MRI which was benign (per patient, report not available for my review).  He will be travelling to [facility] on Monday for 10 days.

## 2023-08-28 NOTE — REASON FOR VISIT
[FreeTextEntry1] : ======================================================================================= Diagnostic Tests: -------------------------------- EC23:  sinus rhythm, normal ECG.  22: sinus rhythm, normal ECG.  22: sinus rhythm, normal ECG.  22: sinus rhythm, possible old septal MI.  21: sinus rhythm, normal ECG.  21: NSR, normal ECG.  20: sinus rhythm, old anterior MI.  19: NSR, anteroseptal MI.  18: NSR, anteroseptal MI.  18: NSR, anteroseptal MI.  18: NSR, normal ECG. 10/14/15: NSR, normal ECG. --------------------------------- CT: 23: head: normal.  21: LM mild, LAD prox mild-to-moderate, mid patent stent, distal moderate, D1/D2 normal, LCX/OM1/OM2 normal, RI mild, RCA mild. RPL/RPDA normal, EF 61%. 21: CT calcium score: Ca+ score 953 (90th percentile), LM 0, , LCx 0, RCA 34, PDA 0, 2 LAD stents. 19: CTA head and neck: minimal carotid plaque b/l, no right carotid dissection.  08/26/15: EBT Ca+ score: 29, 81st percentile --------------------------------- Stress: 22: exercise stress echo: EF 58%, hypokinesis apical septum and apex at rest, no new wall motion abnormalities post 3.6 METs of exercise, + exertional dyspnea.  21: exercise stress echo: EF 63%, normal wall motion, PA pressures, and ECG post 10.1 METS.  20: exercise stress echo: EF 59%, resting akinesis apical septum, apical inferior, apical anterior, and apex, post 10 METS.  19: exercise stress echo: EF 60%, akinesis apical anterior, apical septum, and apex at rest, no inducible ischemia and normal PA pressure post 10 METS.  19: ETT only cardiac rehab: 7.5 METS, normal ECG.  11/05/15: exercise stress echo: EF 64%, 10 METS, normal wall motion and PA pressures. --------------------------------- Cath: 18: at [place]: acute NSTEMI LAD: mid % (PCI x 2), otherwise mild disease --------------------------------- Echo: 22: EF 62%, hypokinesis apical septum, apical anterior wall, apical inferior wall, and apex.  20: EF 68%, akinesis apical septum, apical inferior, apical anterior, and apex.  18: EF 58%, akinesis apical septum and apex, trace MR/TR. 18: EF 48%, grade I diastolic dysfunction, apical septal and apical akinesis, trace MR/TR.  18: EF 35%, large apical MI, mild LVH, trace MR/TR. --------------------------------- Carotid: 09/10/19: sono: b/l prox ICA 1-19%, possible RCCA dissection flap. --------------------------------- Sleep studies: 23: HST: mild-to-moderate EVERTON.  23: moderate EVERTON.   21: mild EVERTON.

## 2023-08-28 NOTE — ASSESSMENT
[FreeTextEntry1] : ======================================================================================= 1. CAD: s/p apical MI s/p PCI LAD (05/28/18): s/p CTCA 09/02/21: LM mild, LAD prox mild-to-moderate, mid patent stent, distal moderate, D1/D2 normal, LCX/OM1/OM2 normal, RI mild, RCA mild. RPL/RPDA normal, EF 61%.             - will pursue aggressive medical management and lifestyle intervention             - continue atorvastatin 80mg po daily              - continue clopidogrel 75mg po qd             - continue off ASA 81mg po qd given multiple duodenal ulcers             - discussed with patient importance of seeking immediate medical attention if anginal type chest pain develops   2. Prior tobacco use:              - has not smoked since 05/25/18             - d/w importance of continued strict abstinence from tobacco  3. Systolic heart failure: EF normalized:              - continue Toprol XL 50mg po daily             - continue losartan 50mg po daily             - will send for an echocardiogram to assess LV function   4. Carotid artery disease:             - will pursue medical management            - will send for a carotid artery sonogram       5. Duodenal ulcers:            - continue off ASA as above            - follow up with gastroenterologist  6. EVERTON: moderate (05/18/23):             - continue therapeutic lifestyle changes            - follow up with sleep medicine specialist, Smith Gonzalez MD   7. Dyslipidemia: LDL 59 (04/06/23):             - continue atorvastatin 80mg po qd            - discussed therapeutic lifestyle changes to promote improved lipid metabolism             - check lab work next visit (he wishes to defer at this time)

## 2023-08-30 ENCOUNTER — APPOINTMENT (OUTPATIENT)
Dept: GASTROENTEROLOGY | Facility: CLINIC | Age: 48
End: 2023-08-30
Payer: MEDICARE

## 2023-08-30 VITALS
WEIGHT: 239 LBS | HEIGHT: 71 IN | DIASTOLIC BLOOD PRESSURE: 58 MMHG | RESPIRATION RATE: 16 BRPM | TEMPERATURE: 97.2 F | HEART RATE: 85 BPM | BODY MASS INDEX: 33.46 KG/M2 | OXYGEN SATURATION: 97 % | SYSTOLIC BLOOD PRESSURE: 93 MMHG

## 2023-08-30 DIAGNOSIS — K90.0 CELIAC DISEASE: ICD-10-CM

## 2023-08-30 PROCEDURE — 99214 OFFICE O/P EST MOD 30 MIN: CPT

## 2023-08-30 NOTE — ASSESSMENT
[FreeTextEntry1] : 49 yo male here for f/u after EGD and colonoscopy, hx of celiac dz.  - D/w pt that he should receive pneumococcal vaccine, DEXA scan from PMD - Ordered blood work to check celiac Ab, vitamin levels -- pt will have drawn at next cardiology appt - Will refer to dietician for gluten free diet counseling - Repeat colonoscopy in 5/2033 - F/u in 6 months

## 2023-08-30 NOTE — HISTORY OF PRESENT ILLNESS
[de-identified] : 5/15/2023 Normal esophagus and stomach; blunted villi in second portion of the duodenum  [FreeTextEntry1] : 5/15/2023 Moderate diverticulosis, L>R -- repeat in TEN years

## 2023-11-01 ENCOUNTER — APPOINTMENT (OUTPATIENT)
Dept: HEART AND VASCULAR | Facility: CLINIC | Age: 48
End: 2023-11-01
Payer: MEDICARE

## 2023-11-01 ENCOUNTER — APPOINTMENT (OUTPATIENT)
Dept: OTOLARYNGOLOGY | Facility: CLINIC | Age: 48
End: 2023-11-01
Payer: MEDICARE

## 2023-11-01 VITALS
HEIGHT: 71 IN | WEIGHT: 233 LBS | DIASTOLIC BLOOD PRESSURE: 73 MMHG | HEART RATE: 94 BPM | SYSTOLIC BLOOD PRESSURE: 112 MMHG | BODY MASS INDEX: 32.62 KG/M2 | OXYGEN SATURATION: 96 %

## 2023-11-01 VITALS
HEART RATE: 94 BPM | DIASTOLIC BLOOD PRESSURE: 73 MMHG | WEIGHT: 239 LBS | BODY MASS INDEX: 33.46 KG/M2 | OXYGEN SATURATION: 96 % | HEIGHT: 71 IN | TEMPERATURE: 97.3 F | SYSTOLIC BLOOD PRESSURE: 112 MMHG

## 2023-11-01 DIAGNOSIS — J34.2 DEVIATED NASAL SEPTUM: ICD-10-CM

## 2023-11-01 PROCEDURE — 99214 OFFICE O/P EST MOD 30 MIN: CPT | Mod: 25

## 2023-11-01 PROCEDURE — 93306 TTE W/DOPPLER COMPLETE: CPT

## 2023-11-01 PROCEDURE — 99214 OFFICE O/P EST MOD 30 MIN: CPT | Mod: 25,95

## 2023-11-01 PROCEDURE — 31231 NASAL ENDOSCOPY DX: CPT

## 2023-11-20 ENCOUNTER — APPOINTMENT (OUTPATIENT)
Dept: HEART AND VASCULAR | Facility: CLINIC | Age: 48
End: 2023-11-20
Payer: MEDICARE

## 2023-11-24 ENCOUNTER — RX RENEWAL (OUTPATIENT)
Age: 48
End: 2023-11-24

## 2023-11-24 RX ORDER — CLOPIDOGREL BISULFATE 75 MG/1
75 TABLET, FILM COATED ORAL DAILY
Qty: 90 | Refills: 3 | Status: ACTIVE | COMMUNITY
Start: 2021-11-24 | End: 1900-01-01

## 2024-01-05 ENCOUNTER — APPOINTMENT (OUTPATIENT)
Dept: HEART AND VASCULAR | Facility: CLINIC | Age: 49
End: 2024-01-05
Payer: MEDICARE

## 2024-01-05 PROCEDURE — 93880 EXTRACRANIAL BILAT STUDY: CPT

## 2024-01-05 NOTE — HISTORY OF PRESENT ILLNESS
[FreeTextEntry1] : Mr. Mott presents for follow up and management of CAD s/p apical MI with PCI LAD 05/25/18, dyslipidemia, overweight, celiac disease, duodenal ulcers, and tobacco use.   As part of a union contract, he had a "heart scan" via electron beam tomography at [facility] on 08/26/15 which revealed a CA+ score of 29 (81st percentile).   He has an extensive tobacco use history (1 PPD since his teenage years).  On 5/25/18 he began to experience substernal chest pain which he described as " an elephant sitting on his chest" associated with SOB.  He called 911 and was taken to [facility], where he was diagnosed with an acute non-ST elevation myocardial infarction and underwent invasive coronary angiography revealing a 100% thrombotic occlusion of the mid LAD with otherwise mild disease.  He went on to have PCI x 2 to his mid LAD with a good result (Joshua Schmitt -[phone], fax [phone]).  He completed cardiac rehab at [facility]. We discussed the results of the TRED-HF trial and agreed to remain on Toprol and losartan despite normalization of his ejection fraction.  On 09/02/21 he had a CTCA which revealed LM mild, LAD prox mild-to-moderate, mid patent stent, distal moderate, D1/D2 normal, LCX/OM1/OM2 normal, RI mild, RCA mild. RPL/RPDA normal, and an EF of 61%. On 08/30/22, he had an exercise stress echocardiogram which revealed an EF of 58%, hypokinesis of the apical septum and apex and rest with no new wall motion abnormalities post 3.6 METs of exercise.  He had marked exertional dyspnea during the test. He also performed a 6-minute walk test during which he was able to walk about 300 meters before stopping secondary to ROWE.  He went to the ED at [facility] on 10/26/23 with complaints of chest pain and SOB.  He was found to have human metapneumovirus positive.  On 11/01/23, he had an echocardiogram which revealed an EF of 56% and hypokinesis of the apical septum and apex.

## 2024-01-05 NOTE — REASON FOR VISIT
[FreeTextEntry1] : ======================================================================================= Diagnostic Tests: -------------------------------- ECG: 10/26/23: sinus rhythm, normal ECG.  03/28/23: sinus rhythm, normal ECG.  11/21/22: sinus rhythm, normal ECG.  08/30/22: sinus rhythm, normal ECG.  02/14/22: sinus rhythm, possible old septal MI.  08/16/21: sinus rhythm, normal ECG.  01/11/21: NSR, normal ECG.  06/16/20: sinus rhythm, old anterior MI.  08/26/19: NSR, anteroseptal MI.  11/14/18: NSR, anteroseptal MI.  05/29/18: NSR, anteroseptal MI.  02/26/18: NSR, normal ECG. 10/14/15: NSR, normal ECG. --------------------------------- CT: 08/08/23: head: normal.  09/02/21: LM mild, LAD prox mild-to-moderate, mid patent stent, distal moderate, D1/D2 normal, LCX/OM1/OM2 normal, RI mild, RCA mild. RPL/RPDA normal, EF 61%. 01/18/21: CT calcium score: Ca+ score 953 (90th percentile), LM 0, , LCx 0, RCA 34, PDA 0, 2 LAD stents. 09/19/19: CTA head and neck: minimal carotid plaque b/l, no right carotid dissection.  08/26/15: EBT Ca+ score: 29, 81st percentile --------------------------------- Stress: 08/30/22: exercise stress echo: EF 58%, hypokinesis apical septum and apex at rest, no new wall motion abnormalities post 3.6 METs of exercise, + exertional dyspnea.  06/08/21: exercise stress echo: EF 63%, normal wall motion, PA pressures, and ECG post 10.1 METS.  04/21/20: exercise stress echo: EF 59%, resting akinesis apical septum, apical inferior, apical anterior, and apex, post 10 METS.  03/04/19: exercise stress echo: EF 60%, akinesis apical anterior, apical septum, and apex at rest, no inducible ischemia and normal PA pressure post 10 METS.  02/19/19: ETT only cardiac rehab: 7.5 METS, normal ECG.  11/05/15: exercise stress echo: EF 64%, 10 METS, normal wall motion and PA pressures. --------------------------------- Cath: 05/25/18: at [place]: acute NSTEMI LAD: mid % (PCI x 2), otherwise mild disease --------------------------------- Echo: 11/01/23: EF 56%, hypokinesis apical septum and apex.  04/11/22: EF 62%, hypokinesis apical septum, apical anterior wall, apical inferior wall, and apex.  02/27/20: EF 68%, akinesis apical septum, apical inferior, apical anterior, and apex.  11/30/18: EF 58%, akinesis apical septum and apex, trace MR/TR. 06/28/18: EF 48%, grade I diastolic dysfunction, apical septal and apical akinesis, trace MR/TR.  05/29/18: EF 35%, large apical MI, mild LVH, trace MR/TR. --------------------------------- Carotid: 01/05/24: sono: b/l prox ICA mild atherosclerosis, no RCCA dissection flap.  09/10/19: sono: b/l prox ICA 1-19%, possible RCCA dissection flap. --------------------------------- Sleep studies: 07/21/23: HST: mild-to-moderate EVERTON.  05/18/23: moderate EVERTON.   06/23/21: mild EVERTON.

## 2024-01-05 NOTE — ASSESSMENT
[FreeTextEntry1] : ======================================================================================= 1. CAD: s/p apical MI s/p PCI LAD (05/28/18): s/p CTCA 09/02/21: LM mild, LAD prox mild-to-moderate, mid patent stent, distal moderate, D1/D2 normal, LCX/OM1/OM2 normal, RI mild, RCA mild. RPL/RPDA normal, EF 61%.    - will pursue aggressive medical management and lifestyle intervention    - continue atorvastatin 80mg po daily    - continue single antiplatelet therapy with clopidogrel 75mg po qd    - continue off ASA 81mg po qd given multiple duodenal ulcers    - discussed with patient importance of seeking immediate medical attention if anginal type chest pain develops  2. Prior tobacco use:    - has not smoked since 05/25/18    - d/w importance of continued strict abstinence from tobacco  3. Systolic heart failure: EF normalized (HFiEF):  NHYA II:      - continue Toprol XL 50mg po daily     - continue losartan 50mg po daily  4. Carotid artery disease:    - continue aggressive medical management    - will follow with serial carotid artery sonograms (has appointment on 11/17/23):  5. Duodenal ulcers:   - continue off ASA as above   - follow up with gastroenterologist  6. EVERTON: moderate (05/18/23): not on CPAP    - continue therapeutic lifestyle changes    - follow up with sleep medicine specialist, Smith Gonzalez MD  7. Dyslipidemia: LDL 59 (04/06/23):    - continue atorvastatin 80mg po qd    - discussed therapeutic lifestyle changes to promote improved lipid metabolism  This encounter was performed as a telehealth encounter employing the [employer], [employer], or other approved audio/video platform.  All components of the evaluation and management were performed per clinical routine with the exception of the physical exam.  The physical exam references my most recent physical exam plus any additional information provided by the patient (i.e. ambulatory vitals/weight) or inspection from the video portion of the encounter.   I spent in excess of 40 minutes on the encounter.    Verbal consent was given on 11/01/23 by Jatinder Mott.   Patient location: The patient was located at the primary address listed in the medical record. Physician location: I was located in my office in [facility].

## 2024-01-09 ENCOUNTER — APPOINTMENT (OUTPATIENT)
Dept: HEART AND VASCULAR | Facility: CLINIC | Age: 49
End: 2024-01-09
Payer: MEDICARE

## 2024-01-09 VITALS — WEIGHT: 230 LBS | HEIGHT: 71 IN | BODY MASS INDEX: 32.2 KG/M2

## 2024-01-09 PROCEDURE — 99443: CPT | Mod: 93

## 2024-04-19 ENCOUNTER — RX RENEWAL (OUTPATIENT)
Age: 49
End: 2024-04-19

## 2024-04-19 RX ORDER — METOPROLOL SUCCINATE 50 MG/1
50 TABLET, EXTENDED RELEASE ORAL DAILY
Qty: 90 | Refills: 3 | Status: ACTIVE | COMMUNITY
Start: 2021-05-05 | End: 1900-01-01

## 2024-05-13 ENCOUNTER — NON-APPOINTMENT (OUTPATIENT)
Age: 49
End: 2024-05-13

## 2024-05-13 ENCOUNTER — APPOINTMENT (OUTPATIENT)
Dept: HEART AND VASCULAR | Facility: CLINIC | Age: 49
End: 2024-05-13
Payer: MEDICARE

## 2024-05-13 VITALS
BODY MASS INDEX: 31.92 KG/M2 | HEIGHT: 71 IN | OXYGEN SATURATION: 96 % | HEART RATE: 75 BPM | DIASTOLIC BLOOD PRESSURE: 61 MMHG | SYSTOLIC BLOOD PRESSURE: 91 MMHG | WEIGHT: 228 LBS

## 2024-05-13 DIAGNOSIS — I10 ESSENTIAL (PRIMARY) HYPERTENSION: ICD-10-CM

## 2024-05-13 DIAGNOSIS — N52.9 MALE ERECTILE DYSFUNCTION, UNSPECIFIED: ICD-10-CM

## 2024-05-13 DIAGNOSIS — I65.29 OCCLUSION AND STENOSIS OF UNSPECIFIED CAROTID ARTERY: ICD-10-CM

## 2024-05-13 DIAGNOSIS — E66.9 OBESITY, UNSPECIFIED: ICD-10-CM

## 2024-05-13 DIAGNOSIS — E78.1 PURE HYPERGLYCERIDEMIA: ICD-10-CM

## 2024-05-13 DIAGNOSIS — I25.10 ATHEROSCLEROTIC HEART DISEASE OF NATIVE CORONARY ARTERY W/OUT ANGINA PECTORIS: ICD-10-CM

## 2024-05-13 DIAGNOSIS — G47.33 OBSTRUCTIVE SLEEP APNEA (ADULT) (PEDIATRIC): ICD-10-CM

## 2024-05-13 PROCEDURE — 93000 ELECTROCARDIOGRAM COMPLETE: CPT

## 2024-05-13 PROCEDURE — G2211 COMPLEX E/M VISIT ADD ON: CPT

## 2024-05-13 PROCEDURE — 99215 OFFICE O/P EST HI 40 MIN: CPT

## 2024-05-13 RX ORDER — ERGOCALCIFEROL 1.25 MG/1
1.25 MG CAPSULE ORAL
Qty: 12 | Refills: 0 | Status: DISCONTINUED | COMMUNITY
Start: 2022-11-09 | End: 2024-05-13

## 2024-05-13 RX ORDER — FLUTICASONE PROPIONATE 50 UG/1
50 SPRAY, METERED NASAL
Qty: 3 | Refills: 1 | Status: DISCONTINUED | COMMUNITY
Start: 2023-05-02 | End: 2024-05-13

## 2024-05-13 NOTE — ASSESSMENT
[FreeTextEntry1] : ======================================================================================= 1. CAD: s/p apical MI s/p PCI LAD (05/28/18): s/p CTCA 09/02/21: LM mild, LAD prox mild-to-moderate, mid patent stent, distal moderate, D1/D2 normal, LCX/OM1/OM2 normal, RI mild, RCA mild. RPL/RPDA normal, EF 61%: + CP:    - will pursue aggressive medical management and lifestyle intervention    - continue atorvastatin 80mg po daily    - continue single antiplatelet therapy with clopidogrel 75mg po qd    - continue off ASA 81mg po qd given multiple duodenal ulcers    - discussed with patient importance of seeking immediate medical attention if anginal type chest pain develops  2. Prior tobacco use:    - has not smoked since 05/25/18    - d/w importance of continued strict abstinence from tobacco  3. Systolic heart failure: EF normalized (HFiEF):  NHYA II:      - continue Toprol XL 50mg po daily     - continue losartan 50mg po daily  4. Carotid artery disease:    - continue aggressive medical management    - will follow with serial carotid artery sonograms (has appointment on 11/17/23):  5. Duodenal ulcers:   - continue off ASA as above   - follow up with gastroenterologist  6. EVERTON: moderate (05/18/23): not on CPAP:     - continue therapeutic lifestyle changes    - follow up with sleep medicine specialist, Smith Gonzalez MD  7. Dyslipidemia: LDL 59 (04/06/23):    - continue atorvastatin 80mg po qd    - discussed therapeutic lifestyle changes to promote improved lipid metabolism    - patient will provide copy of recent lab work from PMD's office for my review  The patient was seen and examined with a cardiology fellow as part of their longitudinal ambulatory cardiology training experience.  Permission was obtained at the time of the visit from the patient for the fellow's participation.  I spent > 45 minutes of total time on this visit, including time spent face-to-face and non-face-to-face.  During this time, I took a relevant history and examined the patient.  I reviewed relevant portions of the medical record and formulated a differential diagnosis and plan.  I explained the relevant cardiac diagnoses to the patient, as well as the work up and management plan.  I answered all questions related to the patient's cardiac conditions.

## 2024-05-13 NOTE — HISTORY OF PRESENT ILLNESS
[FreeTextEntry1] : Mr. Mott presents for follow up and management of CAD s/p apical MI with PCI LAD 05/25/18, dyslipidemia, overweight, celiac disease, duodenal ulcers, and tobacco use.   As part of a union contract, he had a "heart scan" via electron beam tomography at [facility] [place] on 08/26/15 which revealed a CA+ score of 29 (81st percentile).   He has an extensive tobacco use history (1 PPD since his teenage years).  On 5/25/18 he began to experience substernal chest pain which he described as " an elephant sitting on his chest" associated with SOB.  He called 911 and was taken to [facility], where he was diagnosed with an acute non-ST elevation myocardial infarction and underwent invasive coronary angiography revealing a 100% thrombotic occlusion of the mid LAD with otherwise mild disease.  He went on to have PCI x 2 to his mid LAD with a good result (Joshua Schmitt -[phone], fax [phone]).  He completed cardiac rehab at [facility]. We discussed the results of the TRED-HF trial and agreed to remain on Toprol and losartan despite normalization of his ejection fraction.  On 09/02/21 he had a CTCA which revealed LM mild, LAD prox mild-to-moderate, mid patent stent, distal moderate, D1/D2 normal, LCX/OM1/OM2 normal, RI mild, RCA mild. RPL/RPDA normal, and an EF of 61%. On 08/30/22, he had an exercise stress echocardiogram which revealed an EF of 58%, hypokinesis of the apical septum and apex and rest with no new wall motion abnormalities post 3.6 METs of exercise.  He had marked exertional dyspnea during the test. He also performed a 6-minute walk test during which he was able to walk about 300 meters before stopping secondary to ROWE.  He went to the ED at [facility] on 10/26/23 with complaints of chest pain and SOB.  He was found to have human metapneumovirus positive.  On 11/01/23, he had an echocardiogram which revealed an EF of 56% and hypokinesis of the apical septum and apex.   His oldest child is 15 and his twins are 11 years old.  He has complaints of ongoing ROWE to 2 blocks ambulation as well as intermittent short-lived palpitations.

## 2024-05-13 NOTE — REVIEW OF SYSTEMS
[Feeling Fatigued] : feeling fatigued [SOB] : shortness of breath [Dyspnea on exertion] : dyspnea during exertion [Chest Discomfort] : chest discomfort [Snoring] : snoring [Negative] : Heme/Lymph

## 2024-05-13 NOTE — REASON FOR VISIT
[Other: ____] : [unfilled] [FreeTextEntry1] : ======================================================================================= Diagnostic Tests: -------------------------------------------------------------- EC24: sinus rhythm, normal ECG.  10/26/23: sinus rhythm, normal ECG.  23: sinus rhythm, normal ECG.  22: sinus rhythm, normal ECG.  22: sinus rhythm, normal ECG.  22: sinus rhythm, possible old septal MI.  21: sinus rhythm, normal ECG.  21: NSR, normal ECG.  20: sinus rhythm, old anterior MI.  19: NSR, anteroseptal MI.  18: NSR, anteroseptal MI.  18: NSR, anteroseptal MI.  18: NSR, normal ECG. 10/14/15: NSR, normal ECG. -------------------------------------------------------------- CT: 23: head: normal.  21: LM mild, LAD prox mild-to-moderate, mid patent stent, distal moderate, D1/D2 normal, LCX/OM1/OM2 normal, RI mild, RCA mild. RPL/RPDA normal, EF 61%. 21: CT calcium score: Ca+ score 953 (90th percentile), LM 0, , LCx 0, RCA 34, PDA 0, 2 LAD stents. 19: CTA head and neck: minimal carotid plaque b/l, no right carotid dissection.  08/26/15: EBT Ca+ score: 29, 81st percentile -------------------------------------------------------------- Stress: 22: exercise stress echo: EF 58%, hypokinesis apical septum and apex at rest, no new wall motion abnormalities post 3.6 METs of exercise, + exertional dyspnea.  21: exercise stress echo: EF 63%, normal wall motion, PA pressures, and ECG post 10.1 METS.  20: exercise stress echo: EF 59%, resting akinesis apical septum, apical inferior, apical anterior, and apex, post 10 METS.  19: exercise stress echo: EF 60%, akinesis apical anterior, apical septum, and apex at rest, no inducible ischemia and normal PA pressure post 10 METS.  19: ETT only cardiac rehab: 7.5 METS, normal ECG.  11/05/15: exercise stress echo: EF 64%, 10 METS, normal wall motion and PA pressures. -------------------------------------------------------------- Cath: 18: at [place]: acute NSTEMI LAD: mid % (PCI x 2), otherwise mild disease -------------------------------------------------------------- Echo: 23: EF 56%, hypokinesis apical septum and apex.  22: EF 62%, hypokinesis apical septum, apical anterior wall, apical inferior wall, and apex.  20: EF 68%, akinesis apical septum, apical inferior, apical anterior, and apex.  18: EF 58%, akinesis apical septum and apex, trace MR/TR. 18: EF 48%, grade I diastolic dysfunction, apical septal and apical akinesis, trace MR/TR.  18: EF 35%, large apical MI, mild LVH, trace MR/TR. -------------------------------------------------------------- Carotid: 24: sono: b/l prox ICA mild atherosclerosis, no RCCA dissection flap.  09/10/19: sono: b/l prox ICA 1-19%, possible RCCA dissection flap. -------------------------------------------------------------- Sleep studies: 23: HST: mild-to-moderate EVERTON.  23: moderate EVERTON.   21: mild EVERTON.

## 2024-06-14 RX ORDER — ATORVASTATIN CALCIUM 80 MG/1
80 TABLET, FILM COATED ORAL
Qty: 90 | Refills: 3 | Status: ACTIVE | COMMUNITY
Start: 2022-06-17 | End: 1900-01-01

## 2024-07-29 ENCOUNTER — APPOINTMENT (OUTPATIENT)
Dept: OTOLARYNGOLOGY | Facility: CLINIC | Age: 49
End: 2024-07-29

## 2024-08-29 ENCOUNTER — APPOINTMENT (OUTPATIENT)
Dept: PULMONOLOGY | Facility: CLINIC | Age: 49
End: 2024-08-29

## 2024-09-16 ENCOUNTER — APPOINTMENT (OUTPATIENT)
Dept: HEART AND VASCULAR | Facility: CLINIC | Age: 49
End: 2024-09-16
Payer: MEDICARE

## 2024-09-16 VITALS
HEIGHT: 71 IN | OXYGEN SATURATION: 94 % | BODY MASS INDEX: 32.2 KG/M2 | TEMPERATURE: 98 F | WEIGHT: 230 LBS | HEART RATE: 80 BPM | DIASTOLIC BLOOD PRESSURE: 79 MMHG | SYSTOLIC BLOOD PRESSURE: 118 MMHG

## 2024-09-16 DIAGNOSIS — G47.33 OBSTRUCTIVE SLEEP APNEA (ADULT) (PEDIATRIC): ICD-10-CM

## 2024-09-16 DIAGNOSIS — E78.1 PURE HYPERGLYCERIDEMIA: ICD-10-CM

## 2024-09-16 DIAGNOSIS — I65.29 OCCLUSION AND STENOSIS OF UNSPECIFIED CAROTID ARTERY: ICD-10-CM

## 2024-09-16 DIAGNOSIS — I25.10 ATHEROSCLEROTIC HEART DISEASE OF NATIVE CORONARY ARTERY W/OUT ANGINA PECTORIS: ICD-10-CM

## 2024-09-16 DIAGNOSIS — N52.9 MALE ERECTILE DYSFUNCTION, UNSPECIFIED: ICD-10-CM

## 2024-09-16 DIAGNOSIS — I10 ESSENTIAL (PRIMARY) HYPERTENSION: ICD-10-CM

## 2024-09-16 DIAGNOSIS — E66.9 OBESITY, UNSPECIFIED: ICD-10-CM

## 2024-09-16 PROCEDURE — G2211 COMPLEX E/M VISIT ADD ON: CPT

## 2024-09-16 PROCEDURE — 93000 ELECTROCARDIOGRAM COMPLETE: CPT

## 2024-09-16 PROCEDURE — 99215 OFFICE O/P EST HI 40 MIN: CPT

## 2024-09-16 NOTE — REASON FOR VISIT
[Other: ____] : [unfilled] [FreeTextEntry1] : ======================================================================================= Diagnostic Tests: -------------------------------------------------------------- EC24: sinus rhythm, normal ECG.  10/26/23: sinus rhythm, normal ECG.  23: sinus rhythm, normal ECG.  22: sinus rhythm, normal ECG.  22: sinus rhythm, normal ECG.  22: sinus rhythm, possible old septal MI.  21: sinus rhythm, normal ECG.  21: NSR, normal ECG.  20: sinus rhythm, old anterior MI.  19: NSR, anteroseptal MI.  18: NSR, anteroseptal MI.  18: NSR, anteroseptal MI.  18: NSR, normal ECG. 10/14/15: NSR, normal ECG. -------------------------------------------------------------- CT: 23: head: normal.  21: LM mild, LAD prox mild-to-moderate, mid patent stent, distal moderate, D1/D2 normal, LCX/OM1/OM2 normal, RI mild, RCA mild. RPL/RPDA normal, EF 61%. 21: CT calcium score: Ca+ score 953 (90th percentile), LM 0, , LCx 0, RCA 34, PDA 0, 2 LAD stents. 19: CTA head and neck: minimal carotid plaque b/l, no right carotid dissection.  08/26/15: EBT Ca+ score: 29, 81st percentile -------------------------------------------------------------- Stress: 22: exercise stress echo: EF 58%, hypokinesis apical septum and apex at rest, no new wall motion abnormalities post 3.6 METs of exercise, + exertional dyspnea.  21: exercise stress echo: EF 63%, normal wall motion, PA pressures, and ECG post 10.1 METS.  20: exercise stress echo: EF 59%, resting akinesis apical septum, apical inferior, apical anterior, and apex, post 10 METS.  19: exercise stress echo: EF 60%, akinesis apical anterior, apical septum, and apex at rest, no inducible ischemia and normal PA pressure post 10 METS.  19: ETT only cardiac rehab: 7.5 METS, normal ECG.  11/05/15: exercise stress echo: EF 64%, 10 METS, normal wall motion and PA pressures. -------------------------------------------------------------- Cath: 18: at [place]: acute NSTEMI LAD: mid % (PCI x 2), otherwise mild disease -------------------------------------------------------------- Echo: 23: EF 56%, hypokinesis apical septum and apex.  22: EF 62%, hypokinesis apical septum, apical anterior wall, apical inferior wall, and apex.  20: EF 68%, akinesis apical septum, apical inferior, apical anterior, and apex.  18: EF 58%, akinesis apical septum and apex, trace MR/TR. 18: EF 48%, grade I diastolic dysfunction, apical septal and apical akinesis, trace MR/TR.  18: EF 35%, large apical MI, mild LVH, trace MR/TR. -------------------------------------------------------------- Carotid: 24: sono: b/l prox ICA mild atherosclerosis, no RCCA dissection flap.  09/10/19: sono: b/l prox ICA 1-19%, possible RCCA dissection flap. -------------------------------------------------------------- Sleep studies: 23: HST: mild-to-moderate EVERTON.  23: moderate EVERTON.   21: mild EVERTON.

## 2024-09-16 NOTE — HISTORY OF PRESENT ILLNESS
EKG ordered and schedule by Marcia    [FreeTextEntry1] : Mr. Mott presents for follow up and management of CAD s/p apical MI with PCI LAD 05/25/18, dyslipidemia, overweight, celiac disease, duodenal ulcers, and tobacco use.   As part of a union contract, he had a "heart scan" via electron beam tomography at [facility] on 08/26/15 which revealed a CA+ score of 29 (81st percentile).   He has an extensive tobacco use history (1 PPD since his teenage years).  On 5/25/18 he began to experience substernal chest pain which he described as " an elephant sitting on his chest" associated with SOB.  He called 911 and was taken to [facility], where he was diagnosed with an acute non-ST elevation myocardial infarction and underwent invasive coronary angiography revealing a 100% thrombotic occlusion of the mid LAD with otherwise mild disease.  He went on to have PCI x 2 to his mid LAD with a good result (Joshua Schmitt -[phone], fax [phone]).  He completed cardiac rehab at [facility]. We discussed the results of the TRED-HF trial and agreed to remain on Toprol and losartan despite normalization of his ejection fraction.  On 09/02/21 he had a CTCA which revealed LM mild, LAD prox mild-to-moderate, mid patent stent, distal moderate, D1/D2 normal, LCX/OM1/OM2 normal, RI mild, RCA mild. RPL/RPDA normal, and an EF of 61%. On 08/30/22, he had an exercise stress echocardiogram which revealed an EF of 58%, hypokinesis of the apical septum and apex and rest with no new wall motion abnormalities post 3.6 METs of exercise.  He had marked exertional dyspnea during the test. He also performed a 6-minute walk test during which he was able to walk about 300 meters before stopping secondary to ROWE.  He went to the ED at [facility] on 10/26/23 with complaints of chest pain and SOB.  He was found to have human metapneumovirus positive.  On 11/01/23, he had an echocardiogram which revealed an EF of 56% and hypokinesis of the apical septum and apex.   He has complaints of ongoing ROWE to 2 blocks ambulation as well as intermittent short-lived palpitations.  He has complaints of 2-3 times per night nocturia.  In addition, he has complaints of bilateral thigh pain, and we discussed the possibility that it may be statin-associated muscle symptoms.   Unknown

## 2024-09-16 NOTE — HISTORY OF PRESENT ILLNESS
[FreeTextEntry1] : Mr. Mott presents for follow up and management of CAD s/p apical MI with PCI LAD 05/25/18, dyslipidemia, overweight, celiac disease, duodenal ulcers, and tobacco use.   As part of a union contract, he had a "heart scan" via electron beam tomography at [facility] on 08/26/15 which revealed a CA+ score of 29 (81st percentile).   He has an extensive tobacco use history (1 PPD since his teenage years).  On 5/25/18 he began to experience substernal chest pain which he described as " an elephant sitting on his chest" associated with SOB.  He called 911 and was taken to [facility], where he was diagnosed with an acute non-ST elevation myocardial infarction and underwent invasive coronary angiography revealing a 100% thrombotic occlusion of the mid LAD with otherwise mild disease.  He went on to have PCI x 2 to his mid LAD with a good result (Joshua Schmitt -[phone], fax [phone]).  He completed cardiac rehab at [facility]. We discussed the results of the TRED-HF trial and agreed to remain on Toprol and losartan despite normalization of his ejection fraction.  On 09/02/21 he had a CTCA which revealed LM mild, LAD prox mild-to-moderate, mid patent stent, distal moderate, D1/D2 normal, LCX/OM1/OM2 normal, RI mild, RCA mild. RPL/RPDA normal, and an EF of 61%. On 08/30/22, he had an exercise stress echocardiogram which revealed an EF of 58%, hypokinesis of the apical septum and apex and rest with no new wall motion abnormalities post 3.6 METs of exercise.  He had marked exertional dyspnea during the test. He also performed a 6-minute walk test during which he was able to walk about 300 meters before stopping secondary to ROWE.  He went to the ED at [facility] on 10/26/23 with complaints of chest pain and SOB.  He was found to have human metapneumovirus positive.  On 11/01/23, he had an echocardiogram which revealed an EF of 56% and hypokinesis of the apical septum and apex.   He has complaints of ongoing ROWE to 2 blocks ambulation as well as intermittent short-lived palpitations.  He has complaints of 2-3 times per night nocturia.  In addition, he has complaints of bilateral thigh pain, and we discussed the possibility that it may be statin-associated muscle symptoms.

## 2024-09-16 NOTE — ASSESSMENT
[FreeTextEntry1] : ======================================================================================= 1. CAD: s/p apical MI s/p PCI LAD (05/28/18): s/p CTCA 09/02/21: LM mild, LAD prox mild-to-moderate, mid patent stent, distal moderate, D1/D2 normal, LCX/OM1/OM2 normal, RI mild, RCA mild. RPL/RPDA normal, EF 61%: + CP:    - will pursue aggressive medical management and lifestyle intervention    - continue atorvastatin 80mg po daily (trial off x 1 week given myalgia symptoms)     - continue single antiplatelet therapy with clopidogrel 75mg po qd    - continue off ASA 81mg po qd given multiple duodenal ulcers    - discussed with patient importance of seeking immediate medical attention if anginal type chest pain develops  2. Prior tobacco use:    - has not smoked since 05/25/18    - d/w importance of continued strict abstinence from tobacco  3. Systolic heart failure: EF normalized (HFiEF):  NHYA II:      - continue Toprol XL 50mg po daily     - continue losartan 50mg po daily     - will follow left ventricular systolic function with serial echocardiograms (ordered today)   4. Carotid artery disease:    - continue aggressive medical management    - will follow with serial carotid artery sonograms (has appointment on 11/17/23):  5. Duodenal ulcers:   - continue off ASA as above   - follow up with gastroenterologist  6. EVERTON: moderate (05/18/23): not on CPAP:     - continue therapeutic lifestyle changes    - follow up with sleep medicine specialist, Smith Gonzalez MD  7. Dyslipidemia: LDL 50 (05/14/24):     - continue atorvastatin 80mg po qd    - discussed therapeutic lifestyle changes to promote improved lipid metabolism    - patient will provide copy of recent lab work from PMD's office for my review

## 2024-09-16 NOTE — REASON FOR VISIT
[Other: ____] : [unfilled] [FreeTextEntry1] : ======================================================================================= Diagnostic Tests: -------------------------------------------------------------- EC24: sinus rhythm, normal ECG.  10/26/23: sinus rhythm, normal ECG.  23: sinus rhythm, normal ECG.  22: sinus rhythm, normal ECG.  22: sinus rhythm, normal ECG.  22: sinus rhythm, possible old septal MI.  21: sinus rhythm, normal ECG.  21: NSR, normal ECG.  20: sinus rhythm, old anterior MI.  19: NSR, anteroseptal MI.  18: NSR, anteroseptal MI.  18: NSR, anteroseptal MI.  18: NSR, normal ECG. 10/14/15: NSR, normal ECG. -------------------------------------------------------------- CT: 23: head: normal.  21: LM mild, LAD prox mild-to-moderate, mid patent stent, distal moderate, D1/D2 normal, LCX/OM1/OM2 normal, RI mild, RCA mild. RPL/RPDA normal, EF 61%. 21: CT calcium score: Ca+ score 953 (90th percentile), LM 0, , LCx 0, RCA 34, PDA 0, 2 LAD stents. 19: CTA head and neck: minimal carotid plaque b/l, no right carotid dissection.  08/26/15: EBT Ca+ score: 29, 81st percentile -------------------------------------------------------------- Stress: 22: exercise stress echo: EF 58%, hypokinesis apical septum and apex at rest, no new wall motion abnormalities post 3.6 METs of exercise, + exertional dyspnea.  21: exercise stress echo: EF 63%, normal wall motion, PA pressures, and ECG post 10.1 METS.  20: exercise stress echo: EF 59%, resting akinesis apical septum, apical inferior, apical anterior, and apex, post 10 METS.  19: exercise stress echo: EF 60%, akinesis apical anterior, apical septum, and apex at rest, no inducible ischemia and normal PA pressure post 10 METS.  19: ETT only cardiac rehab: 7.5 METS, normal ECG.  11/05/15: exercise stress echo: EF 64%, 10 METS, normal wall motion and PA pressures. -------------------------------------------------------------- Cath: 18: at [facility]: acute NSTEMI LAD: mid % (PCI x 2), otherwise mild disease -------------------------------------------------------------- Echo: 23: EF 56%, hypokinesis apical septum and apex.  22: EF 62%, hypokinesis apical septum, apical anterior wall, apical inferior wall, and apex.  20: EF 68%, akinesis apical septum, apical inferior, apical anterior, and apex.  18: EF 58%, akinesis apical septum and apex, trace MR/TR. 18: EF 48%, grade I diastolic dysfunction, apical septal and apical akinesis, trace MR/TR.  18: EF 35%, large apical MI, mild LVH, trace MR/TR. -------------------------------------------------------------- Carotid: 24: sono: b/l prox ICA mild atherosclerosis, no RCCA dissection flap.  09/10/19: sono: b/l prox ICA 1-19%, possible RCCA dissection flap. -------------------------------------------------------------- Sleep studies: 23: HST: mild-to-moderate EVERTON.  23: moderate EVERTON.   21: mild EVERTON.

## 2024-10-16 RX ORDER — ROSUVASTATIN CALCIUM 40 MG/1
40 TABLET, FILM COATED ORAL DAILY
Qty: 90 | Refills: 3 | Status: ACTIVE | COMMUNITY
Start: 2024-10-16 | End: 1900-01-01

## 2024-12-19 ENCOUNTER — RX RENEWAL (OUTPATIENT)
Age: 49
End: 2024-12-19

## 2025-01-07 ENCOUNTER — APPOINTMENT (OUTPATIENT)
Dept: HEART AND VASCULAR | Facility: CLINIC | Age: 50
End: 2025-01-07
Payer: MEDICARE

## 2025-01-07 ENCOUNTER — NON-APPOINTMENT (OUTPATIENT)
Age: 50
End: 2025-01-07

## 2025-01-07 VITALS
BODY MASS INDEX: 35.14 KG/M2 | HEART RATE: 78 BPM | DIASTOLIC BLOOD PRESSURE: 78 MMHG | OXYGEN SATURATION: 98 % | HEIGHT: 71 IN | TEMPERATURE: 97 F | WEIGHT: 251 LBS | SYSTOLIC BLOOD PRESSURE: 116 MMHG

## 2025-01-07 PROCEDURE — G2211 COMPLEX E/M VISIT ADD ON: CPT

## 2025-01-07 PROCEDURE — 93000 ELECTROCARDIOGRAM COMPLETE: CPT

## 2025-01-07 PROCEDURE — 99214 OFFICE O/P EST MOD 30 MIN: CPT

## 2025-01-13 ENCOUNTER — APPOINTMENT (OUTPATIENT)
Dept: PULMONOLOGY | Facility: CLINIC | Age: 50
End: 2025-01-13

## 2025-01-13 ENCOUNTER — APPOINTMENT (OUTPATIENT)
Dept: HEART AND VASCULAR | Facility: CLINIC | Age: 50
End: 2025-01-13
Payer: MEDICARE

## 2025-01-13 DIAGNOSIS — I25.10 ATHEROSCLEROTIC HEART DISEASE OF NATIVE CORONARY ARTERY W/OUT ANGINA PECTORIS: ICD-10-CM

## 2025-01-13 DIAGNOSIS — N52.9 MALE ERECTILE DYSFUNCTION, UNSPECIFIED: ICD-10-CM

## 2025-01-13 DIAGNOSIS — E66.811 OBESITY, CLASS 1: ICD-10-CM

## 2025-01-13 DIAGNOSIS — I10 ESSENTIAL (PRIMARY) HYPERTENSION: ICD-10-CM

## 2025-01-13 DIAGNOSIS — E78.1 PURE HYPERGLYCERIDEMIA: ICD-10-CM

## 2025-01-13 DIAGNOSIS — I65.29 OCCLUSION AND STENOSIS OF UNSPECIFIED CAROTID ARTERY: ICD-10-CM

## 2025-01-13 DIAGNOSIS — R06.09 OTHER FORMS OF DYSPNEA: ICD-10-CM

## 2025-01-13 DIAGNOSIS — G47.33 OBSTRUCTIVE SLEEP APNEA (ADULT) (PEDIATRIC): ICD-10-CM

## 2025-01-13 PROCEDURE — 99214 OFFICE O/P EST MOD 30 MIN: CPT

## 2025-01-13 PROCEDURE — G2211 COMPLEX E/M VISIT ADD ON: CPT

## 2025-01-13 PROCEDURE — 94618 PULMONARY STRESS TESTING: CPT

## 2025-05-15 ENCOUNTER — APPOINTMENT (OUTPATIENT)
Dept: HEART AND VASCULAR | Facility: CLINIC | Age: 50
End: 2025-05-15
Payer: MEDICARE

## 2025-05-15 VITALS
SYSTOLIC BLOOD PRESSURE: 106 MMHG | BODY MASS INDEX: 34.43 KG/M2 | HEART RATE: 75 BPM | WEIGHT: 245.9 LBS | DIASTOLIC BLOOD PRESSURE: 67 MMHG | OXYGEN SATURATION: 96 % | HEIGHT: 71 IN | TEMPERATURE: 97.7 F

## 2025-05-15 DIAGNOSIS — R06.09 OTHER FORMS OF DYSPNEA: ICD-10-CM

## 2025-05-15 DIAGNOSIS — E66.811 OBESITY, CLASS 1: ICD-10-CM

## 2025-05-15 DIAGNOSIS — E78.1 PURE HYPERGLYCERIDEMIA: ICD-10-CM

## 2025-05-15 DIAGNOSIS — I10 ESSENTIAL (PRIMARY) HYPERTENSION: ICD-10-CM

## 2025-05-15 DIAGNOSIS — G47.33 OBSTRUCTIVE SLEEP APNEA (ADULT) (PEDIATRIC): ICD-10-CM

## 2025-05-15 DIAGNOSIS — N52.9 MALE ERECTILE DYSFUNCTION, UNSPECIFIED: ICD-10-CM

## 2025-05-15 DIAGNOSIS — I25.10 ATHEROSCLEROTIC HEART DISEASE OF NATIVE CORONARY ARTERY W/OUT ANGINA PECTORIS: ICD-10-CM

## 2025-05-15 DIAGNOSIS — I65.29 OCCLUSION AND STENOSIS OF UNSPECIFIED CAROTID ARTERY: ICD-10-CM

## 2025-05-15 PROCEDURE — 99214 OFFICE O/P EST MOD 30 MIN: CPT | Mod: 25

## 2025-05-15 PROCEDURE — 93306 TTE W/DOPPLER COMPLETE: CPT | Mod: 59

## 2025-05-18 ENCOUNTER — NON-APPOINTMENT (OUTPATIENT)
Age: 50
End: 2025-05-18

## 2025-07-10 ENCOUNTER — APPOINTMENT (OUTPATIENT)
Dept: HEART AND VASCULAR | Facility: CLINIC | Age: 50
End: 2025-07-10
Payer: MEDICARE

## 2025-07-10 VITALS
DIASTOLIC BLOOD PRESSURE: 74 MMHG | HEART RATE: 84 BPM | OXYGEN SATURATION: 94 % | HEIGHT: 71 IN | WEIGHT: 240 LBS | SYSTOLIC BLOOD PRESSURE: 107 MMHG | TEMPERATURE: 98.2 F | BODY MASS INDEX: 33.6 KG/M2

## 2025-07-10 PROCEDURE — 99214 OFFICE O/P EST MOD 30 MIN: CPT

## 2025-07-10 PROCEDURE — G2211 COMPLEX E/M VISIT ADD ON: CPT

## 2025-07-10 PROCEDURE — 93000 ELECTROCARDIOGRAM COMPLETE: CPT

## 2025-09-19 RX ORDER — LOSARTAN POTASSIUM 50 MG/1
50 TABLET, FILM COATED ORAL DAILY
Qty: 90 | Refills: 3 | Status: ACTIVE | COMMUNITY

## (undated) DEVICE — FORCEP RADIAL JAW 4 W NDL 2.2MM 2.8MM 240CM ORANGE DISP